# Patient Record
Sex: FEMALE | Employment: FULL TIME | URBAN - METROPOLITAN AREA
[De-identification: names, ages, dates, MRNs, and addresses within clinical notes are randomized per-mention and may not be internally consistent; named-entity substitution may affect disease eponyms.]

---

## 2018-06-15 ENCOUNTER — APPOINTMENT (OUTPATIENT)
Dept: GENERAL RADIOLOGY | Age: 73
End: 2018-06-15
Attending: EMERGENCY MEDICINE
Payer: MEDICARE

## 2018-06-15 ENCOUNTER — HOSPITAL ENCOUNTER (EMERGENCY)
Age: 73
Discharge: HOME | End: 2018-06-15
Attending: EMERGENCY MEDICINE
Payer: MEDICARE

## 2018-06-15 VITALS
TEMPERATURE: 98.8 F | WEIGHT: 140 LBS | HEART RATE: 95 BPM | HEIGHT: 61 IN | DIASTOLIC BLOOD PRESSURE: 91 MMHG | SYSTOLIC BLOOD PRESSURE: 175 MMHG | BODY MASS INDEX: 26.43 KG/M2 | RESPIRATION RATE: 16 BRPM | OXYGEN SATURATION: 96 %

## 2018-06-15 DIAGNOSIS — M79.604 RIGHT LEG PAIN: Primary | ICD-10-CM

## 2018-06-15 PROCEDURE — 99283 EMERGENCY DEPT VISIT LOW MDM: CPT

## 2018-06-15 PROCEDURE — 72100 X-RAY EXAM L-S SPINE 2/3 VWS: CPT

## 2018-06-15 PROCEDURE — 73502 X-RAY EXAM HIP UNI 2-3 VIEWS: CPT

## 2018-06-15 PROCEDURE — 74011250637 HC RX REV CODE- 250/637: Performed by: EMERGENCY MEDICINE

## 2018-06-15 RX ORDER — TRAMADOL HYDROCHLORIDE 50 MG/1
50 TABLET ORAL
Qty: 15 TAB | Refills: 0 | Status: ON HOLD | OUTPATIENT
Start: 2018-06-15 | End: 2020-11-17

## 2018-06-15 RX ORDER — ACETAMINOPHEN 325 MG/1
975 TABLET ORAL
Status: COMPLETED | OUTPATIENT
Start: 2018-06-15 | End: 2018-06-15

## 2018-06-15 RX ORDER — LIDOCAINE 50 MG/G
PATCH TOPICAL
Qty: 1 EACH | Refills: 0 | Status: SHIPPED | OUTPATIENT
Start: 2018-06-15 | End: 2018-08-08 | Stop reason: ALTCHOICE

## 2018-06-15 RX ADMIN — ACETAMINOPHEN 975 MG: 325 TABLET ORAL at 17:08

## 2018-06-15 NOTE — ED TRIAGE NOTES
Pt reports having right hip and right leg pain that began approx 4 weeks ago. Pt states the pain radiates into right thigh. Pt denies fall or injury.

## 2018-06-15 NOTE — ED PROVIDER NOTES
HPI Comments: 68 y.o. female with no significant past medical history who presents from home with chief complaint of back pain. Pt reports \"sharp\" R lower back pain for 4 weeks which radiates through her R leg to her R knee and occasionally into her R calf for several months. She states she had difficulty standing and walking d/t pain this morning. Pt also c/o increased fatigue for 2-3 weeks. Per Pt's Jew sister, Pt spoke w/ cardiologist Dr. Erika Lino last week who noted her sx \"may be arterial\". She is scheduled for cardiology evaluation. Pt has been taking Tylenol w/ mild relief. When asked about a PCP Pt notes her PCP is Dr. Lam Sorenson, but she \"has not seen her in 10 years\". Pt denies similar sx in the past. Pt denies prior hx of HTN, high cholesterol, and back injury. Pt denies fever, numbness, abdominal pain, cough, vomiting, and diarrhea. There are no other acute medical concerns at this time. Old Chart Review: Pt has no prior ED visits in the  system. PCP: Damian Koehler MD    Note written by Kathy Mosqueda, as dictated by Juan Pascal, DO 4:33 PM    The history is provided by the patient and a friend. No past medical history on file. Past Surgical History:   Procedure Laterality Date    ABDOMEN SURGERY PROC UNLISTED      HX APPENDECTOMY  1975    HX HEENT  2006    OS Cataract         No family history on file. Social History     Social History    Marital status: SINGLE     Spouse name: N/A    Number of children: N/A    Years of education: N/A     Occupational History    Not on file. Social History Main Topics    Smoking status: Never Smoker    Smokeless tobacco: Never Used    Alcohol use No    Drug use: Not on file    Sexual activity: Not on file     Other Topics Concern    Not on file     Social History Narrative    No narrative on file         ALLERGIES: Aleve [naproxen sodium]    Review of Systems   Constitutional: Positive for fatigue.  Negative for chills and fever. Respiratory: Negative for cough and shortness of breath. Cardiovascular: Negative for chest pain. Gastrointestinal: Negative for abdominal pain, diarrhea, nausea and vomiting. Genitourinary: Negative for dysuria and hematuria. Musculoskeletal: Positive for back pain, gait problem and myalgias. Neurological: Negative for numbness. All other systems reviewed and are negative. Vitals:    06/15/18 1432   BP: (!) 175/91   Pulse: 95   Resp: 16   Temp: 98.8 °F (37.1 °C)   SpO2: 96%   Weight: 63.5 kg (140 lb)   Height: 5' 1\" (1.549 m)            Physical Exam      Constitutional: Pt is awake and alert. Pt appears well-developed and well-nourished. NAD. HENT:   Head: Normocephalic and atraumatic. Nose: Nose normal.   Mouth/Throat: Oropharynx is clear and moist. No oropharyngeal exudate. Eyes: Conjunctivae and extraocular motions are normal. Pupils are equal, round, and reactive to light. Right eye exhibits no discharge. Left eye exhibits no discharge. No scleral icterus. Neck: No tracheal deviation present. Supple neck. Cardiovascular: Normal rate, regular rhythm, normal heart sounds and intact distal pulses. Exam reveals no gallop and no friction rub. No murmur heard. Pulmonary/Chest: Effort normal and breath sounds normal.  Pt  has no wheezes. Pt  has no rales. Abdominal: Soft. Pt  exhibits no distension and no mass. No tenderness. Pt  has no rebound and no guarding. Musculoskeletal:  Pt  exhibits no edema and no tenderness. Non-tender back. R knee atraumatic. Ext: Normal ROM in all four extremities; not tender to palpation; distal pulses are normal, no edema. Neurological:  Pt is alert. nonfocal neuro exam. Normal motor and sensation. Skin: Skin is warm and dry. Pt  is not diaphoretic. No skin changes. No rash on back. Psychiatric:  Pt  has a normal mood and affect.  Behavior is normal.   Note written by Kathy Sheriff, as dictated by Anne Crooks DO 4:33 PM    [facility]      ED Course       Procedures         reviewed xrays  Not sciatica  Likely msk back pain  Doubt AAA or renal colic. Very msk sounding  otc meds plus flexeril and lidoderm prn. F/u with Dr Sherril Spatz who she has seen for PCP in past, just not in 6 yrs, she says. Sent chart to PCP.

## 2018-06-15 NOTE — ED NOTES
Discharge instructions and prescriptions x2 reviewed with patient. Patient verbalized understanding. Patient via wheelchair to waiting room. Accompanied by sister.

## 2018-06-15 NOTE — DISCHARGE INSTRUCTIONS
Low Back Pain: Exercises  Your Care Instructions  Here are some examples of typical rehabilitation exercises for your condition. Start each exercise slowly. Ease off the exercise if you start to have pain. Your doctor or physical therapist will tell you when you can start these exercises and which ones will work best for you. How to do the exercises  Press-up    1. Lie on your stomach, supporting your body with your forearms. 2. Press your elbows down into the floor to raise your upper back. As you do this, relax your stomach muscles and allow your back to arch without using your back muscles. As your press up, do not let your hips or pelvis come off the floor. 3. Hold for 15 to 30 seconds, then relax. 4. Repeat 2 to 4 times. Alternate arm and leg (bird dog) exercise    Do this exercise slowly. Try to keep your body straight at all times, and do not let one hip drop lower than the other. 1. Start on the floor, on your hands and knees. 2. Tighten your belly muscles. 3. Raise one leg off the floor, and hold it straight out behind you. Be careful not to let your hip drop down, because that will twist your trunk. 4. Hold for about 6 seconds, then lower your leg and switch to the other leg. 5. Repeat 8 to 12 times on each leg. 6. Over time, work up to holding for 10 to 30 seconds each time. 7. If you feel stable and secure with your leg raised, try raising the opposite arm straight out in front of you at the same time. Knee-to-chest exercise    1. Lie on your back with your knees bent and your feet flat on the floor. 2. Bring one knee to your chest, keeping the other foot flat on the floor (or keeping the other leg straight, whichever feels better on your lower back). 3. Keep your lower back pressed to the floor. Hold for at least 15 to 30 seconds. 4. Relax, and lower the knee to the starting position. 5. Repeat with the other leg. Repeat 2 to 4 times with each leg.   6. To get more stretch, put your other leg flat on the floor while pulling your knee to your chest.  Curl-ups    1. Lie on the floor on your back with your knees bent at a 90-degree angle. Your feet should be flat on the floor, about 12 inches from your buttocks. 2. Cross your arms over your chest. If this bothers your neck, try putting your hands behind your neck (not your head), with your elbows spread apart. 3. Slowly tighten your belly muscles and raise your shoulder blades off the floor. 4. Keep your head in line with your body, and do not press your chin to your chest.  5. Hold this position for 1 or 2 seconds, then slowly lower yourself back down to the floor. 6. Repeat 8 to 12 times. Pelvic tilt exercise    1. Lie on your back with your knees bent. 2. \"Brace\" your stomach. This means to tighten your muscles by pulling in and imagining your belly button moving toward your spine. You should feel like your back is pressing to the floor and your hips and pelvis are rocking back. 3. Hold for about 6 seconds while you breathe smoothly. 4. Repeat 8 to 12 times. Heel dig bridging    1. Lie on your back with both knees bent and your ankles bent so that only your heels are digging into the floor. Your knees should be bent about 90 degrees. 2. Then push your heels into the floor, squeeze your buttocks, and lift your hips off the floor until your shoulders, hips, and knees are all in a straight line. 3. Hold for about 6 seconds as you continue to breathe normally, and then slowly lower your hips back down to the floor and rest for up to 10 seconds. 4. Do 8 to 12 repetitions. Hamstring stretch in doorway    1. Lie on your back in a doorway, with one leg through the open door. 2. Slide your leg up the wall to straighten your knee. You should feel a gentle stretch down the back of your leg. 3. Hold the stretch for at least 15 to 30 seconds. Do not arch your back, point your toes, or bend either knee.  Keep one heel touching the floor and the other heel touching the wall. 4. Repeat with your other leg. 5. Do 2 to 4 times for each leg. Hip flexor stretch    1. Kneel on the floor with one knee bent and one leg behind you. Place your forward knee over your foot. Keep your other knee touching the floor. 2. Slowly push your hips forward until you feel a stretch in the upper thigh of your rear leg. 3. Hold the stretch for at least 15 to 30 seconds. Repeat with your other leg. 4. Do 2 to 4 times on each side. Wall sit    1. Stand with your back 10 to 12 inches away from a wall. 2. Lean into the wall until your back is flat against it. 3. Slowly slide down until your knees are slightly bent, pressing your lower back into the wall. 4. Hold for about 6 seconds, then slide back up the wall. 5. Repeat 8 to 12 times. Follow-up care is a key part of your treatment and safety. Be sure to make and go to all appointments, and call your doctor if you are having problems. It's also a good idea to know your test results and keep a list of the medicines you take. Where can you learn more? Go to http://regineMightyTexttommy.info/. Enter T510 in the search box to learn more about \"Low Back Pain: Exercises. \"  Current as of: March 21, 2017  Content Version: 11.4  © 9459-0418 Healthwise, Incorporated. Care instructions adapted under license by [employer] (which disclaims liability or warranty for this information). If you have questions about a medical condition or this instruction, always ask your healthcare professional. [place] [address] any warranty or liability for your use of this information. Back Pain: Care Instructions  Your Care Instructions    Back pain has many possible causes. It is often related to problems with muscles and ligaments of the back. It may also be related to problems with the nerves, discs, or bones of the back.  Moving, lifting, standing, sitting, or sleeping in an awkward way can strain the back. Sometimes you don't notice the injury until later. Arthritis is another common cause of back pain. Although it may hurt a lot, back pain usually improves on its own within several weeks. Most people recover in 12 weeks or less. Using good home treatment and being careful not to stress your back can help you feel better sooner. Follow-up care is a key part of your treatment and safety. Be sure to make and go to all appointments, and call your doctor if you are having problems. It's also a good idea to know your test results and keep a list of the medicines you take. How can you care for yourself at home? · Sit or lie in positions that are most comfortable and reduce your pain. Try one of these positions when you lie down:  ¨ Lie on your back with your knees bent and supported by large pillows. ¨ Lie on the floor with your legs on the seat of a sofa or chair. Maria Teresa Mitchell on your side with your knees and hips bent and a pillow between your legs. ¨ Lie on your stomach if it does not make pain worse. · Do not sit up in bed, and avoid soft couches and twisted positions. Bed rest can help relieve pain at first, but it delays healing. Avoid bed rest after the first day of back pain. · Change positions every 30 minutes. If you must sit for long periods of time, take breaks from sitting. Get up and walk around, or lie in a comfortable position. · Try using a heating pad on a low or medium setting for 15 to 20 minutes every 2 or 3 hours. Try a warm shower in place of one session with the heating pad. · You can also try an ice pack for 10 to 15 minutes every 2 to 3 hours. Put a thin cloth between the ice pack and your skin. · Take pain medicines exactly as directed. ¨ If the doctor gave you a prescription medicine for pain, take it as prescribed. ¨ If you are not taking a prescription pain medicine, ask your doctor if you can take an over-the-counter medicine. · Take short walks several times a day. You can start with 5 to 10 minutes, 3 or 4 times a day, and work up to longer walks. Walk on level surfaces and avoid hills and stairs until your back is better. · Return to work and other activities as soon as you can. Continued rest without activity is usually not good for your back. · To prevent future back pain, do exercises to stretch and strengthen your back and stomach. Learn how to use good posture, safe lifting techniques, and proper body mechanics. When should you call for help? Call your doctor now or seek immediate medical care if:  ? · You have new or worsening numbness in your legs. ? · You have new or worsening weakness in your legs. (This could make it hard to stand up.)   ? · You lose control of your bladder or bowels. ? Watch closely for changes in your health, and be sure to contact your doctor if:  ? · Your pain gets worse. ? · You are not getting better after 2 weeks. Where can you learn more? Go to http://regine-tommy.info/. Enter I274 in the search box to learn more about \"Back Pain: Care Instructions. \"  Current as of: March 21, 2017  Content Version: 11.4  © 2035-5754 Healthwise, Incorporated. Care instructions adapted under license by [employer] (which disclaims liability or warranty for this information). If you have questions about a medical condition or this instruction, always ask your healthcare professional. [address] any warranty or liability for your use of this information.

## 2018-07-12 ENCOUNTER — APPOINTMENT (OUTPATIENT)
Dept: CT IMAGING | Age: 73
DRG: 544 | End: 2018-07-12
Attending: EMERGENCY MEDICINE
Payer: MEDICARE

## 2018-07-12 ENCOUNTER — HOSPITAL ENCOUNTER (INPATIENT)
Age: 73
LOS: 1 days | Discharge: ANOTHER HOSPITAL | DRG: 544 | End: 2018-07-13
Attending: EMERGENCY MEDICINE | Admitting: INTERNAL MEDICINE
Payer: MEDICARE

## 2018-07-12 DIAGNOSIS — D72.829 LEUKOCYTOSIS, UNSPECIFIED TYPE: ICD-10-CM

## 2018-07-12 DIAGNOSIS — M84.451A: Primary | ICD-10-CM

## 2018-07-12 LAB
ALBUMIN SERPL-MCNC: 3.9 G/DL (ref 3.5–5)
ALBUMIN/GLOB SERPL: 1 {RATIO} (ref 1.1–2.2)
ALP SERPL-CCNC: 60 U/L (ref 45–117)
ALT SERPL-CCNC: 14 U/L (ref 12–78)
ANION GAP SERPL CALC-SCNC: 12 MMOL/L (ref 5–15)
AST SERPL-CCNC: 18 U/L (ref 15–37)
BILIRUB SERPL-MCNC: 0.8 MG/DL (ref 0.2–1)
BUN SERPL-MCNC: 22 MG/DL (ref 6–20)
BUN/CREAT SERPL: 34 (ref 12–20)
CALCIUM SERPL-MCNC: 8.8 MG/DL (ref 8.5–10.1)
CHLORIDE SERPL-SCNC: 100 MMOL/L (ref 97–108)
CO2 SERPL-SCNC: 23 MMOL/L (ref 21–32)
CREAT SERPL-MCNC: 0.65 MG/DL (ref 0.55–1.02)
CRP SERPL-MCNC: 2.64 MG/DL (ref 0–0.6)
GLOBULIN SER CALC-MCNC: 4 G/DL (ref 2–4)
GLUCOSE SERPL-MCNC: 136 MG/DL (ref 65–100)
POTASSIUM SERPL-SCNC: 3.3 MMOL/L (ref 3.5–5.1)
PROT SERPL-MCNC: 7.9 G/DL (ref 6.4–8.2)
SODIUM SERPL-SCNC: 135 MMOL/L (ref 136–145)

## 2018-07-12 PROCEDURE — 73700 CT LOWER EXTREMITY W/O DYE: CPT

## 2018-07-12 PROCEDURE — 80053 COMPREHEN METABOLIC PANEL: CPT | Performed by: EMERGENCY MEDICINE

## 2018-07-12 PROCEDURE — 96374 THER/PROPH/DIAG INJ IV PUSH: CPT

## 2018-07-12 PROCEDURE — 85652 RBC SED RATE AUTOMATED: CPT | Performed by: EMERGENCY MEDICINE

## 2018-07-12 PROCEDURE — 96375 TX/PRO/DX INJ NEW DRUG ADDON: CPT

## 2018-07-12 PROCEDURE — 99284 EMERGENCY DEPT VISIT MOD MDM: CPT

## 2018-07-12 PROCEDURE — 74011250636 HC RX REV CODE- 250/636: Performed by: EMERGENCY MEDICINE

## 2018-07-12 PROCEDURE — 86140 C-REACTIVE PROTEIN: CPT | Performed by: EMERGENCY MEDICINE

## 2018-07-12 PROCEDURE — 51702 INSERT TEMP BLADDER CATH: CPT

## 2018-07-12 PROCEDURE — 85025 COMPLETE CBC W/AUTO DIFF WBC: CPT | Performed by: EMERGENCY MEDICINE

## 2018-07-12 PROCEDURE — 36415 COLL VENOUS BLD VENIPUNCTURE: CPT | Performed by: EMERGENCY MEDICINE

## 2018-07-12 PROCEDURE — 77030005514 HC CATH URETH FOL14 BARD -A

## 2018-07-12 RX ORDER — HYDROMORPHONE HYDROCHLORIDE 1 MG/ML
0.5 INJECTION, SOLUTION INTRAMUSCULAR; INTRAVENOUS; SUBCUTANEOUS ONCE
Status: COMPLETED | OUTPATIENT
Start: 2018-07-12 | End: 2018-07-12

## 2018-07-12 RX ORDER — ONDANSETRON 2 MG/ML
4 INJECTION INTRAMUSCULAR; INTRAVENOUS
Status: COMPLETED | OUTPATIENT
Start: 2018-07-12 | End: 2018-07-12

## 2018-07-12 RX ADMIN — ONDANSETRON 4 MG: 2 INJECTION INTRAMUSCULAR; INTRAVENOUS at 23:39

## 2018-07-12 RX ADMIN — HYDROMORPHONE HYDROCHLORIDE 0.5 MG: 1 INJECTION, SOLUTION INTRAMUSCULAR; INTRAVENOUS; SUBCUTANEOUS at 23:39

## 2018-07-12 NOTE — IP AVS SNAPSHOT
[address] 
[phone] Patient: Edison Osorio MRN: [id number] MLN:[id number] A check jackie indicates which time of day the medication should be taken. My Medications CONTINUE taking these medications Instructions Each Dose to Equal  
 Morning Noon Evening Bedtime  
 lidocaine 5 % Commonly known as:  Carolyn Valdez Your last dose was: Your next dose is:    
   
   
 Apply patch to the affected area for 12 hours a day and remove for 12 hours a day. traMADol 50 mg tablet Commonly known as:  ULTRAM  
   
Your last dose was: Your next dose is: Take 1 Tab by mouth every six (6) hours as needed for Pain.  Max Daily Amount: 200 mg.  
 50 mg

## 2018-07-13 ENCOUNTER — APPOINTMENT (OUTPATIENT)
Dept: GENERAL RADIOLOGY | Age: 73
DRG: 544 | End: 2018-07-13
Attending: EMERGENCY MEDICINE
Payer: MEDICARE

## 2018-07-13 ENCOUNTER — APPOINTMENT (OUTPATIENT)
Dept: GENERAL RADIOLOGY | Age: 73
DRG: 544 | End: 2018-07-13
Attending: PHYSICIAN ASSISTANT
Payer: MEDICARE

## 2018-07-13 ENCOUNTER — APPOINTMENT (OUTPATIENT)
Dept: MRI IMAGING | Age: 73
DRG: 544 | End: 2018-07-13
Attending: PHYSICIAN ASSISTANT
Payer: MEDICARE

## 2018-07-13 VITALS
HEART RATE: 63 BPM | RESPIRATION RATE: 16 BRPM | OXYGEN SATURATION: 98 % | SYSTOLIC BLOOD PRESSURE: 139 MMHG | DIASTOLIC BLOOD PRESSURE: 76 MMHG | TEMPERATURE: 99 F

## 2018-07-13 PROBLEM — M84.40XA PATHOLOGIC FRACTURE: Status: ACTIVE | Noted: 2018-07-13

## 2018-07-13 PROBLEM — M84.451A PATHOLOGICAL FRACTURE OF RIGHT HIP (HCC): Status: ACTIVE | Noted: 2018-07-13

## 2018-07-13 LAB
ABO + RH BLD: NORMAL
AMORPH CRY URNS QL MICRO: ABNORMAL
APPEARANCE UR: CLEAR
APTT PPP: 24 SEC (ref 22.1–32)
ATRIAL RATE: 61 BPM
BACTERIA URNS QL MICRO: NEGATIVE /HPF
BASOPHILS # BLD: 0.1 K/UL (ref 0–0.1)
BASOPHILS NFR BLD: 0 % (ref 0–1)
BILIRUB UR QL: NEGATIVE
BLOOD GROUP ANTIBODIES SERPL: NORMAL
CALCULATED P AXIS, ECG09: 57 DEGREES
CALCULATED R AXIS, ECG10: 44 DEGREES
CALCULATED T AXIS, ECG11: 29 DEGREES
CHOLEST SERPL-MCNC: 111 MG/DL
COLOR UR: ABNORMAL
DIAGNOSIS, 93000: NORMAL
DIFFERENTIAL METHOD BLD: ABNORMAL
EOSINOPHIL # BLD: 0 K/UL (ref 0–0.4)
EOSINOPHIL NFR BLD: 0 % (ref 0–7)
EPITH CASTS URNS QL MICRO: ABNORMAL /LPF
ERYTHROCYTE [DISTWIDTH] IN BLOOD BY AUTOMATED COUNT: 13.1 % (ref 11.5–14.5)
ERYTHROCYTE [SEDIMENTATION RATE] IN BLOOD: 9 MM/HR (ref 0–30)
EST. AVERAGE GLUCOSE BLD GHB EST-MCNC: 117 MG/DL
GLUCOSE UR STRIP.AUTO-MCNC: NEGATIVE MG/DL
HBA1C MFR BLD: 5.7 % (ref 4.2–6.3)
HCT VFR BLD AUTO: 35.6 % (ref 35–47)
HDLC SERPL-MCNC: 42 MG/DL
HDLC SERPL: 2.6 {RATIO} (ref 0–5)
HGB BLD-MCNC: 11.7 G/DL (ref 11.5–16)
HGB UR QL STRIP: NEGATIVE
IMM GRANULOCYTES # BLD: 0.3 K/UL (ref 0–0.04)
IMM GRANULOCYTES NFR BLD AUTO: 1 % (ref 0–0.5)
INR PPP: 1.1 (ref 0.9–1.1)
KETONES UR QL STRIP.AUTO: 15 MG/DL
LACTATE SERPL-SCNC: 1.3 MMOL/L (ref 0.4–2)
LDLC SERPL CALC-MCNC: 56.2 MG/DL (ref 0–100)
LEUKOCYTE ESTERASE UR QL STRIP.AUTO: NEGATIVE
LIPID PROFILE,FLP: NORMAL
LYMPHOCYTES # BLD: 1.4 K/UL (ref 0.8–3.5)
LYMPHOCYTES NFR BLD: 7 % (ref 12–49)
MAGNESIUM SERPL-MCNC: 2 MG/DL (ref 1.6–2.4)
MCH RBC QN AUTO: 31.7 PG (ref 26–34)
MCHC RBC AUTO-ENTMCNC: 32.9 G/DL (ref 30–36.5)
MCV RBC AUTO: 96.5 FL (ref 80–99)
MONOCYTES # BLD: 1.7 K/UL (ref 0–1)
MONOCYTES NFR BLD: 8 % (ref 5–13)
NEUTS SEG # BLD: 18.1 K/UL (ref 1.8–8)
NEUTS SEG NFR BLD: 84 % (ref 32–75)
NITRITE UR QL STRIP.AUTO: NEGATIVE
NRBC # BLD: 0 K/UL (ref 0–0.01)
NRBC BLD-RTO: 0 PER 100 WBC
P-R INTERVAL, ECG05: 150 MS
PH UR STRIP: 7 [PH] (ref 5–8)
PHOSPHATE SERPL-MCNC: 3.1 MG/DL (ref 2.6–4.7)
PLATELET # BLD AUTO: 183 K/UL (ref 150–400)
PMV BLD AUTO: 11 FL (ref 8.9–12.9)
PROT UR STRIP-MCNC: NEGATIVE MG/DL
PROTHROMBIN TIME: 11.5 SEC (ref 9–11.1)
Q-T INTERVAL, ECG07: 416 MS
QRS DURATION, ECG06: 76 MS
QTC CALCULATION (BEZET), ECG08: 418 MS
RBC # BLD AUTO: 3.69 M/UL (ref 3.8–5.2)
RBC #/AREA URNS HPF: ABNORMAL /HPF (ref 0–5)
SP GR UR REFRACTOMETRY: 1.02 (ref 1–1.03)
SPECIMEN EXP DATE BLD: NORMAL
THERAPEUTIC RANGE,PTTT: NORMAL SECS (ref 58–77)
TRIGL SERPL-MCNC: 64 MG/DL (ref ?–150)
TSH SERPL DL<=0.05 MIU/L-ACNC: 0.69 UIU/ML (ref 0.36–3.74)
UR CULT HOLD, URHOLD: NORMAL
UROBILINOGEN UR QL STRIP.AUTO: 1 EU/DL (ref 0.2–1)
VENTRICULAR RATE, ECG03: 61 BPM
VLDLC SERPL CALC-MCNC: 12.8 MG/DL
WBC # BLD AUTO: 21.6 K/UL (ref 3.6–11)
WBC URNS QL MICRO: ABNORMAL /HPF (ref 0–4)

## 2018-07-13 PROCEDURE — 85730 THROMBOPLASTIN TIME PARTIAL: CPT | Performed by: EMERGENCY MEDICINE

## 2018-07-13 PROCEDURE — 74011250636 HC RX REV CODE- 250/636: Performed by: EMERGENCY MEDICINE

## 2018-07-13 PROCEDURE — 71045 X-RAY EXAM CHEST 1 VIEW: CPT

## 2018-07-13 PROCEDURE — 84443 ASSAY THYROID STIM HORMONE: CPT | Performed by: INTERNAL MEDICINE

## 2018-07-13 PROCEDURE — 93005 ELECTROCARDIOGRAM TRACING: CPT

## 2018-07-13 PROCEDURE — 74011250637 HC RX REV CODE- 250/637: Performed by: PHYSICIAN ASSISTANT

## 2018-07-13 PROCEDURE — 94760 N-INVAS EAR/PLS OXIMETRY 1: CPT

## 2018-07-13 PROCEDURE — 80061 LIPID PANEL: CPT | Performed by: INTERNAL MEDICINE

## 2018-07-13 PROCEDURE — 74011250637 HC RX REV CODE- 250/637: Performed by: INTERNAL MEDICINE

## 2018-07-13 PROCEDURE — 74011250636 HC RX REV CODE- 250/636: Performed by: INTERNAL MEDICINE

## 2018-07-13 PROCEDURE — 73723 MRI JOINT LWR EXTR W/O&W/DYE: CPT

## 2018-07-13 PROCEDURE — 83605 ASSAY OF LACTIC ACID: CPT | Performed by: INTERNAL MEDICINE

## 2018-07-13 PROCEDURE — 81001 URINALYSIS AUTO W/SCOPE: CPT | Performed by: EMERGENCY MEDICINE

## 2018-07-13 PROCEDURE — 86900 BLOOD TYPING SEROLOGIC ABO: CPT | Performed by: EMERGENCY MEDICINE

## 2018-07-13 PROCEDURE — 83036 HEMOGLOBIN GLYCOSYLATED A1C: CPT | Performed by: INTERNAL MEDICINE

## 2018-07-13 PROCEDURE — 65270000029 HC RM PRIVATE

## 2018-07-13 PROCEDURE — A9575 INJ GADOTERATE MEGLUMI 0.1ML: HCPCS | Performed by: INTERNAL MEDICINE

## 2018-07-13 PROCEDURE — 84100 ASSAY OF PHOSPHORUS: CPT | Performed by: INTERNAL MEDICINE

## 2018-07-13 PROCEDURE — 83735 ASSAY OF MAGNESIUM: CPT | Performed by: INTERNAL MEDICINE

## 2018-07-13 PROCEDURE — 85610 PROTHROMBIN TIME: CPT | Performed by: EMERGENCY MEDICINE

## 2018-07-13 PROCEDURE — 73502 X-RAY EXAM HIP UNI 2-3 VIEWS: CPT

## 2018-07-13 PROCEDURE — 36415 COLL VENOUS BLD VENIPUNCTURE: CPT | Performed by: EMERGENCY MEDICINE

## 2018-07-13 PROCEDURE — 77010033678 HC OXYGEN DAILY

## 2018-07-13 RX ORDER — SODIUM CHLORIDE 0.9 % (FLUSH) 0.9 %
5-10 SYRINGE (ML) INJECTION AS NEEDED
Status: DISCONTINUED | OUTPATIENT
Start: 2018-07-13 | End: 2018-07-13 | Stop reason: HOSPADM

## 2018-07-13 RX ORDER — HYDROMORPHONE HYDROCHLORIDE 1 MG/ML
1 INJECTION, SOLUTION INTRAMUSCULAR; INTRAVENOUS; SUBCUTANEOUS ONCE
Status: COMPLETED | OUTPATIENT
Start: 2018-07-13 | End: 2018-07-13

## 2018-07-13 RX ORDER — POTASSIUM CHLORIDE 750 MG/1
40 TABLET, FILM COATED, EXTENDED RELEASE ORAL
Status: COMPLETED | OUTPATIENT
Start: 2018-07-13 | End: 2018-07-13

## 2018-07-13 RX ORDER — ACETAMINOPHEN 325 MG/1
650 TABLET ORAL
Status: DISCONTINUED | OUTPATIENT
Start: 2018-07-13 | End: 2018-07-13 | Stop reason: HOSPADM

## 2018-07-13 RX ORDER — SODIUM CHLORIDE 0.9 % (FLUSH) 0.9 %
5-10 SYRINGE (ML) INJECTION EVERY 8 HOURS
Status: DISCONTINUED | OUTPATIENT
Start: 2018-07-13 | End: 2018-07-13 | Stop reason: HOSPADM

## 2018-07-13 RX ORDER — OXYCODONE HYDROCHLORIDE 5 MG/1
5 TABLET ORAL
Status: DISCONTINUED | OUTPATIENT
Start: 2018-07-13 | End: 2018-07-13 | Stop reason: HOSPADM

## 2018-07-13 RX ORDER — HYDROCODONE BITARTRATE AND ACETAMINOPHEN 5; 325 MG/1; MG/1
1 TABLET ORAL
Status: DISCONTINUED | OUTPATIENT
Start: 2018-07-13 | End: 2018-07-13 | Stop reason: ALTCHOICE

## 2018-07-13 RX ORDER — FENTANYL CITRATE 50 UG/ML
25 INJECTION, SOLUTION INTRAMUSCULAR; INTRAVENOUS
Status: DISCONTINUED | OUTPATIENT
Start: 2018-07-13 | End: 2018-07-13 | Stop reason: HOSPADM

## 2018-07-13 RX ORDER — GADOTERATE MEGLUMINE 376.9 MG/ML
14 INJECTION INTRAVENOUS
Status: COMPLETED | OUTPATIENT
Start: 2018-07-13 | End: 2018-07-13

## 2018-07-13 RX ORDER — SODIUM CHLORIDE 9 MG/ML
100 INJECTION, SOLUTION INTRAVENOUS CONTINUOUS
Status: DISCONTINUED | OUTPATIENT
Start: 2018-07-13 | End: 2018-07-13 | Stop reason: HOSPADM

## 2018-07-13 RX ORDER — OXYCODONE HYDROCHLORIDE 5 MG/1
10 TABLET ORAL
Status: DISCONTINUED | OUTPATIENT
Start: 2018-07-13 | End: 2018-07-13 | Stop reason: HOSPADM

## 2018-07-13 RX ORDER — HEPARIN SODIUM 5000 [USP'U]/ML
5000 INJECTION, SOLUTION INTRAVENOUS; SUBCUTANEOUS EVERY 8 HOURS
Status: DISCONTINUED | OUTPATIENT
Start: 2018-07-13 | End: 2018-07-13 | Stop reason: HOSPADM

## 2018-07-13 RX ORDER — BISACODYL 5 MG
5 TABLET, DELAYED RELEASE (ENTERIC COATED) ORAL DAILY PRN
Status: DISCONTINUED | OUTPATIENT
Start: 2018-07-13 | End: 2018-07-13 | Stop reason: HOSPADM

## 2018-07-13 RX ORDER — ACETAMINOPHEN 325 MG/1
650 TABLET ORAL EVERY 6 HOURS
Status: DISCONTINUED | OUTPATIENT
Start: 2018-07-13 | End: 2018-07-13 | Stop reason: HOSPADM

## 2018-07-13 RX ORDER — LIDOCAINE 50 MG/G
1 PATCH TOPICAL EVERY 24 HOURS
Status: DISCONTINUED | OUTPATIENT
Start: 2018-07-13 | End: 2018-07-13 | Stop reason: HOSPADM

## 2018-07-13 RX ORDER — ONDANSETRON 2 MG/ML
4 INJECTION INTRAMUSCULAR; INTRAVENOUS
Status: DISCONTINUED | OUTPATIENT
Start: 2018-07-13 | End: 2018-07-13 | Stop reason: HOSPADM

## 2018-07-13 RX ADMIN — ONDANSETRON 4 MG: 2 INJECTION, SOLUTION INTRAMUSCULAR; INTRAVENOUS at 11:30

## 2018-07-13 RX ADMIN — HYDROCODONE BITARTRATE AND ACETAMINOPHEN 1 TABLET: 5; 325 TABLET ORAL at 05:29

## 2018-07-13 RX ADMIN — POTASSIUM CHLORIDE 40 MEQ: 750 TABLET, EXTENDED RELEASE ORAL at 05:25

## 2018-07-13 RX ADMIN — HEPARIN SODIUM 5000 UNITS: 5000 INJECTION, SOLUTION INTRAVENOUS; SUBCUTANEOUS at 05:25

## 2018-07-13 RX ADMIN — HEPARIN SODIUM 5000 UNITS: 5000 INJECTION, SOLUTION INTRAVENOUS; SUBCUTANEOUS at 12:45

## 2018-07-13 RX ADMIN — SODIUM CHLORIDE 100 ML/HR: 900 INJECTION, SOLUTION INTRAVENOUS at 03:12

## 2018-07-13 RX ADMIN — GADOTERATE MEGLUMINE 14 ML: 376.9 INJECTION INTRAVENOUS at 10:00

## 2018-07-13 RX ADMIN — OXYCODONE HYDROCHLORIDE 10 MG: 5 TABLET ORAL at 11:27

## 2018-07-13 RX ADMIN — HYDROMORPHONE HYDROCHLORIDE 1 MG: 1 INJECTION, SOLUTION INTRAMUSCULAR; INTRAVENOUS; SUBCUTANEOUS at 02:36

## 2018-07-13 RX ADMIN — Medication 5 ML: at 06:00

## 2018-07-13 RX ADMIN — FENTANYL CITRATE 12.5 MCG: 50 INJECTION, SOLUTION INTRAMUSCULAR; INTRAVENOUS at 08:42

## 2018-07-13 RX ADMIN — ACETAMINOPHEN 650 MG: 325 TABLET ORAL at 11:26

## 2018-07-13 NOTE — PROGRESS NOTES
ORTH FRACTURE PROGRESS NOTE    2018  Admit Date:   2018    Post Op day: * No surgery found *    Subjective:    Charlotte Romero still in considerable pain. 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 Patient with no previous cancer history but also no true mechanism for current fracture. Pain continues to be an issue but is only getting Norco.  NPO at this time.     PT/OT:   Gait:                    Vital Signs:    Patient Vitals for the past 8 hrs:   BP Temp Pulse Resp SpO2   18 0828 121/66 98.4 °F (36.9 °C) (!) 59 16 99 %   18 0523 159/75 97.2 °F (36.2 °C) 63 16 97 %   18 0330 125/67 - - - 96 %     Temp (24hrs), Av.4 °F (36.9 °C), Min:97.2 °F (36.2 °C), Max:99.2 °F (37.3 °C)      Pain Control:   Pain Assessment  Pain Scale 1: Numeric (0 - 10)  Pain Intensity 1: 10  Pain Onset 1: Today  Pain Location 1: Hip  Pain Orientation 1: Right  Pain Description 1: Aching  Pain Intervention(s) 1: Medication (see MAR)    Meds:    Current Facility-Administered Medications   Medication Dose Route Frequency    lidocaine (LIDODERM) 5 % patch 1 Patch  1 Patch TransDERmal Q24H    sodium chloride (NS) flush 5-10 mL  5-10 mL IntraVENous Q8H    sodium chloride (NS) flush 5-10 mL  5-10 mL IntraVENous PRN    0.9% sodium chloride infusion  100 mL/hr IntraVENous CONTINUOUS    acetaminophen (TYLENOL) tablet 650 mg  650 mg Oral Q4H PRN    fentaNYL citrate (PF) injection 25 mcg  25 mcg IntraVENous Q4H PRN    ondansetron (ZOFRAN) injection 4 mg  4 mg IntraVENous Q4H PRN    bisacodyl (DULCOLAX) tablet 5 mg  5 mg Oral DAILY PRN    heparin (porcine) injection 5,000 Units  5,000 Units SubCUTAneous Q8H    oxyCODONE IR (ROXICODONE) tablet 5 mg  5 mg Oral Q4H PRN    oxyCODONE IR (ROXICODONE) tablet 10 mg  10 mg Oral Q4H PRN    acetaminophen (TYLENOL) tablet 650 mg  650 mg Oral Q6H       LAB:    Recent Labs      18   0111  18   2319   HCT --   35.6   HGB   --   11.7   INR  1.1   --        Transfuse PRBC's:      Assessment & Physician's Comment:  Neurovascular checks within normal limits  Orientation:  Oriented    Principal Problem:    Pathological fracture of right hip ([address] (7/13/2018)    Active Problems:    Pathologic fracture (7/13/2018)        Plan:    Patient condition discussed with Dr Laila Liang who is of the opinion that she would be best served by Orthopedic Oncology services at [facility]. He has spoken with Dr Diedre Ganser there who has agreed to accept the patient. Patient informed of imaging results and concerns for neoplastic disease that is necessitating the transfer. She and her  agree to transfer.   Case Management for transfer transportation needs  Pain medications ordered  Remain NPO for now  Medical management per primary team    Dr Laila Liang aware of patient and in agreement with plan of care    David Waller PA-C   Orthopedic Trauma Service   [facility]

## 2018-07-13 NOTE — DISCHARGE SUMMARY
Discharge Summary       PATIENT ID: Edison Osorio  MRN: [id number]   YOB: 1945    DATE OF ADMISSION: 7/12/2018  9:38 PM    DATE OF DISCHARGE: 7/13/18   PRIMARY CARE PROVIDER: Jasmine Arriaga MD     ATTENDING PHYSICIAN: Cristofer Awad  DISCHARGING PROVIDER: Giacomo Veloz MD    To contact this individual call [phone] and ask the  to page. If unavailable ask to be transferred the Adult Hospitalist Department. CONSULTATIONS: IP CONSULT TO ORTHOPEDIC SURGERY    PROCEDURES/SURGERIES: * No surgery found *    ADMITTING DIAGNOSES & HOSPITAL COURSE:   HISTORY OF PRESENT ILLNESS:  This is a 80-year-old woman without any significant past medical history, who was in her usual state of health until a couple of weeks ago when the patient started experiencing right hip pain. The pain is a constant, sharp pain with radiation to the right foot, worse with ambulation. No known relieving factors. The pain is progressive to the extent that the patient is not able to ambulate and has to be using a walker for ambulation. She was seen by primary care physician. Patient was placed on pain medication and short course of prednisone without any significant improvement. Patient was brought to the emergency room today, because of worsening symptoms, for evaluation. When the patient arrived at the emergency room, CT scan of the right lower extremity was obtained. The CT scan shows a pathological fracture through the right intertrochanteric region with a soft tissue extension anteriorly. The emergency room physician consulted orthopedic service on call. Admission to the hospitalist service was advised. MRI of the lower extremity was also advised. Patient was subsequently referred to the hospitalist service for evaluation for admission. There was no history of trauma to the right hip. Patient did not fall. DISCHARGE DIAGNOSES / PLAN:      1.  Transferred to [facility] for further MGMT      Hien Osorio discussed with Dr Yajaira Mcdowell who is of the opinion that she would be best served by Orthopedic Oncology services at [facility]. He has spoken with Dr Edwin Hernandez there who has agreed to accept the patient. Patient informed of imaging results and concerns for neoplastic disease that is necessitating the transfer. She and her  agree to transfer.     Dr Yajaira Mcdowell aware of patient and in agreement with plan of care       PENDING TEST RESULTS:   At the time of discharge the following test results are still pending:     FOLLOW UP APPOINTMENTS:    Follow-up Information     Follow up With Details Comments Contact Info    Gera Zamudio MD In 4 weeks  [address]  [phone]             ADDITIONAL CARE RECOMMENDATIONS:     DIET: Cardiac Diet    ACTIVITY: Activity as tolerated    WOUND CARE:     EQUIPMENT needed:       DISCHARGE MEDICATIONS:  Current Discharge Medication List      CONTINUE these medications which have NOT CHANGED    Details   traMADol (ULTRAM) 50 mg tablet Take 1 Tab by mouth every six (6) hours as needed for Pain. Max Daily Amount: 200 mg. Qty: 15 Tab, Refills: 0    Associated Diagnoses: Right leg pain      lidocaine (LIDODERM) 5 % Apply patch to the affected area for 12 hours a day and remove for 12 hours a day. Qty: 1 Each, Refills: 0               NOTIFY YOUR PHYSICIAN FOR ANY OF THE FOLLOWING:   Fever over 101 degrees for 24 hours. Chest pain, shortness of breath, fever, chills, nausea, vomiting, diarrhea, change in mentation, falling, weakness, bleeding. Severe pain or pain not relieved by medications. Or, any other signs or symptoms that you may have questions about.     DISPOSITION:    Home With:   OT  PT  HH  RN       Long term SNF/Inpatient Rehab    Independent/assisted living    Hospice   x Other: [facility]       PATIENT CONDITION AT DISCHARGE:     Functional status   x Poor     Deconditioned     Independent      Cognition   x  Lucid     Forgetful Dementia      Catheters/lines (plus indication)    Viveros     PICC     PEG    x None      Code status   x  Full code     DNR      PHYSICAL EXAMINATION AT DISCHARGE:   Refer to Progress Note      CHRONIC MEDICAL DIAGNOSES:  Problem List as of 7/13/2018  Date Reviewed: 7/13/2018          Codes Class Noted - Resolved    * (Principal)Pathological fracture of right hip ([facility] [address] ICD-10-CM: U12.078G  ICD-9-CM: 733.14  7/13/2018 - Present        Pathologic fracture ICD-10-CM: M84.40XA  ICD-9-CM: 733.10  7/13/2018 - Present        Cataract of right eye ICD-10-CM: H26.9  ICD-9-CM: 366.9  2/8/2013 - Present              Greater than 20  minutes were spent with the patient on counseling and coordination of care    Signed:   Leeroy Rosas MD  7/13/2018  2:15 PM

## 2018-07-13 NOTE — PROGRESS NOTES
CRM was notified that this patient will be transferred to [address] under Dr. Chio Hogan service. BURAK called the transfer center at [phone] and faxed the face sheet to [phone]. They will reach out to Dr. Geena Santos. Dr. Farrah Stiles has spoken to him regarding the transfer and he has accepted. CRM will follow. KJT    The patient will be transferred to [address] [place] [address] via [facility] to [address] [place] [address] wing room 340 unit # [phone]. Discharge folder on the hard chart. [employer]AL[employer] to follow.  SHANNANT

## 2018-07-13 NOTE — ED PROVIDER NOTES
HPI Comments: 68 y.o. female with no significant past medical history who presents via EMS from [facility] with chief complaint of hip pain. Patient arrives c/o several day Hx of right hip pain radiating down her right leg. Patient ambulates with a cane at baseline, but has been using a walker secondary to pain. Patient reports being unable to ambulate today at all. Patient states she was evaluated by a PA for Dr. Therese Farnsworth 3 days ago (7/9/18) and prescribed prednisone and tramadol for pain, with which she has been compliant. Last dose this morning. Patient denies recent precipitating falls or trauma. Pain is 10/10 and increased with movement. Patient denies back pain, fever, dysuria, and bowel changes. There are no other acute medical concerns at this time. Social hx: Never tobacco smoker; Denies EtOH use; Denies illicit drug use  PCP: Adri Calvert MD    Note written by Kathy Rivera, as dictated by Delgado Osborne MD 11:00 PM    The history is provided by the patient and a friend. No  was used. History reviewed. No pertinent past medical history. Past Surgical History:   Procedure Laterality Date    ABDOMEN SURGERY PROC UNLISTED      HX APPENDECTOMY  1975    HX HEENT  2006    OS Cataract         History reviewed. No pertinent family history. Social History     Social History    Marital status: SINGLE     Spouse name: N/A    Number of children: N/A    Years of education: N/A     Occupational History    Not on file. Social History Main Topics    Smoking status: Never Smoker    Smokeless tobacco: Never Used    Alcohol use No    Drug use: Not on file    Sexual activity: Not on file     Other Topics Concern    Not on file     Social History Narrative         ALLERGIES: Aleve [naproxen sodium]    Review of Systems   Constitutional: Negative for appetite change, chills and fever.    HENT: Negative for congestion, nosebleeds and sore throat. Eyes: Negative for pain and discharge. Respiratory: Negative for cough and shortness of breath. Cardiovascular: Negative for chest pain. Gastrointestinal: Negative for abdominal pain, blood in stool, constipation, diarrhea, nausea and vomiting. Genitourinary: Negative for dysuria. Musculoskeletal: Positive for gait problem. Negative for back pain. +right hip pain radiating down right leg   Skin: Negative for rash. Neurological: Negative for weakness and headaches. Hematological: Negative for adenopathy. Psychiatric/Behavioral: Negative. All other systems reviewed and are negative. Vitals:    07/12/18 2151   BP: 157/77   Pulse: 65   Resp: 18   Temp: 99.2 °F (37.3 °C)   SpO2: 98%            Physical Exam   Constitutional: She is oriented to person, place, and time. She appears well-developed and well-nourished. HENT:   Head: Normocephalic and atraumatic. Mouth/Throat: Oropharynx is clear and moist.   Eyes: Conjunctivae are normal.   Neck: Normal range of motion. Neck supple. Cardiovascular: Normal rate, regular rhythm and normal heart sounds. Pulmonary/Chest: Effort normal and breath sounds normal.   Abdominal: Soft. Bowel sounds are normal. There is no tenderness. Musculoskeletal: She exhibits tenderness. She exhibits no edema or deformity. Severe pain with any movement of R hip. Neurological: She is alert and oriented to person, place, and time. Skin: Skin is warm and dry. Psychiatric: She has a normal mood and affect. Her behavior is normal.   Nursing note and vitals reviewed. Note written by Kathy Soto, as dictated by Tyshawn Bueno MD 11:00 PM    [facility]      ED Course       Procedures     12:52 AM  Dr. Sheri Fraire spoke with SHERRY Rodríguez - ortho - will evaluate patient. Would like MRI with contrast ordered. CONSULT:  Dr. Elisabeth Gonzales - will admit    ED EKG interpretation:  Rhythm: normal sinus rhythm; and regular .  Rate (approx.): 60; Axis: normal; P wave: normal; QRS interval: normal ; ST/T wave: non-specific changes; This EKG was interpreted by Laura Ramos MD,ED Provider. A/P:  1. Pathologic femur fracture - admit. Preop preformed.   2. Leukocytosis

## 2018-07-13 NOTE — CONSULTS
Geriatric Fracture Consult  Patient: Sammy Roland  YOB: 1945   MRN: [id number]      Consult Date: 7/13/2018     Chief Complaint: Right hip pain  ED Presentation Time: < 8 hours  Mechanism of Injury: No trauma, pain for 2-3 months, worse in last few days  Ambulatory Status: Started using cane several months ago for right hip pain  Past Medical History: History reviewed. No pertinent past medical history. Allergies: Allergies   Allergen Reactions    Aleve [Naproxen Sodium] Nausea Only      Past Surgical History:   Past Surgical History:   Procedure Laterality Date    ABDOMEN SURGERY PROC UNLISTED      HX APPENDECTOMY  1975    HX HEENT  2006    OS Cataract      Social History:   Social History     Social History    Marital status: SINGLE     Spouse name: N/A    Number of children: N/A    Years of education: N/A     Occupational History    Not on file. Social History Main Topics    Smoking status: Never Smoker    Smokeless tobacco: Never Used    Alcohol use No    Drug use: Not on file    Sexual activity: Not on file     Other Topics Concern    Not on file     Social History Narrative      Dwelling Status: Alone with Spouse/Significant Other  Current Anticoagulant Medications: none  History of falls: NO  Prior Fractures: none  Osteoporosis Medications: none    Labs:    Lab Results   Component Value Date/Time    HGB 11.7 07/12/2018 11:19 PM    WBC 21.6 (H) 07/12/2018 11:19 PM    INR 1.1 07/13/2018 01:11 AM    Albumin 3.9 07/12/2018 11:19 PM      IMAGING STUDIES:   CT LOW EXT RT WO CONT 7/12/2018 11:34 PM  INDICATION:  unable to walk. Severe R hip/femur pain. EXAM: CT right femur. Comparison Lottie 15, 2018. Thin section axial images were obtained. From these sagittal and coronal  reformats were performed. CT dose reduction was achieved through use of a  standardized protocol tailored for this examination and automatic exposure  control for dose modulation.    FINDINGS: There is an acute pathologic fracture of the right intertrochanteric  region. Fracture is mildly impacted Lytic lesion measures approximately 7.7 x  4.5 x 5.3 cm. There is soft tissue extension anteriorly. Small right hip  effusion. No other lytic lesions are demonstrated. IMPRESSION:  1. Pathologic fracture through the right intertrochanteric region with soft  tissue extension anteriorly. Physical Exam:   General: 77yo female, cooperative, no distress, appears stated age  CNS: alert/oriented, normal mood  Vascular: pedal pulses normal and no edema   Skin: no rash or abnormalities  Extremities: right leg shortened, pain with hip ROM  Neurologic: motor/sensation normal in right LE    Assessment: Pathologic intertrochanteric fracture right hip     Plan: Concerning lytic lesion in right proximal femur with pathologic intertrochanteric hip fracture. Will need surgical repair as well as investigation of lesion. Will get plain xrays and MRI of right hip. Plan for IM nailing of right hip and biopsy of bone in OR. Discussed with patient. Keep NPO.      Signed by: SHERRY Saba   Today's Date: July 13, 2018

## 2018-07-13 NOTE — ED NOTES
PLEASE CALL Sister Teetee Recinos prior to surgery! They reside in [facility] and would need 45 minutes in order to get here.    [phone]

## 2018-07-13 NOTE — H&P
[address]  HISTORY AND PHYSICAL      Francisco J Naqvi  MR#: [id number]  : 1945  ACCOUNT #: [de-identified]   ADMIT DATE: 2018    PRIMARY CARE PHYSICIAN:  Yolanda Barroso MD      SOURCE OF INFORMATION:  Patient and patient's friends who were present at the bedside. CHIEF COMPLAINT:  Right hip pain. HISTORY OF PRESENT ILLNESS:  This is a 77-year-old woman without any significant past medical history, who was in her usual state of health until a couple of weeks ago when the patient started experiencing right hip pain. The pain is a constant, sharp pain with radiation to the right foot, worse with ambulation. No known relieving factors. The pain is progressive to the extent that the patient is not able to ambulate and has to be using a walker for ambulation. She was seen by primary care physician. Patient was placed on pain medication and short course of prednisone without any significant improvement. Patient was brought to the emergency room today, because of worsening symptoms, for evaluation. When the patient arrived at the emergency room, CT scan of the right lower extremity was obtained. The CT scan shows a pathological fracture through the right intertrochanteric region with a soft tissue extension anteriorly. The emergency room physician consulted orthopedic service on call. Admission to the hospitalist service was advised. MRI of the lower extremity was also advised. Patient was subsequently referred to the hospitalist service for evaluation for admission. There was no history of trauma to the right hip. Patient did not fall. PAST MEDICAL HISTORY:  Not significant. ALLERGIES:  PATIENT IS ALLERGIC TO NAPROSYN. MEDICATIONS:  Lidoderm patch 5% applied to skin every 12 hours, tramadol 50 mg every 6 hours as needed for pain. FAMILY HISTORY:  This was reviewed. It is not pertinent to present illness.       PAST SURGICAL HISTORY:  This is significant for cataract extraction, appendectomy, abdominal surgery. SOCIAL HISTORY:  No history of alcohol or tobacco abuse. REVIEW OF SYSTEMS:    HEAD, EYES, EARS, NOSE AND THROAT:  No headache, no dizziness, no blurring of vision, no photophobia. RESPIRATORY SYSTEM:  No cough, no shortness of breath, no hemoptysis. CARDIOVASCULAR SYSTEM:  No chest pain, orthopnea, no palpitations. GASTROINTESTINAL SYSTEM:  No nausea and vomiting, no diarrhea, no constipation. GENITOURINARY SYSTEM:  No dysuria, no urgency and no frequency. All other systems are reviewed and they are negative. PHYSICAL EXAMINATION:    GENERAL APPEARANCE:  The patient appeared ill, in moderate distress. VITAL SIGNS:  On arrival to the emergency room, temperature 99.2, pulse 65, respiratory rate 18, blood pressure 157/77, oxygen saturation 98% on room air. HEENT:  Normocephalic, atraumatic. EYES:  Normal eye movements. No redness, no drainage, no discharge. EARS:  Normal external ears with no obvious drainage. NOSE:  No deformity, no drainage. MOUTH AND THROAT:  No visible oral lesions. NECK:  Supple. No JVD, no thyromegaly. CHEST:  Clear breath sounds. No wheezing, no crackles. HEART:  Normal S1 and S2.  Regular. No clinically appreciable murmur. ABDOMEN:  Soft, nontender, normal bowel sounds. CENTRAL NERVOUS SYSTEM:  Alert, oriented x3. No gross focal neurological deficit. EXTREMITIES:  No edema. Pulses 2+ bilaterally. MUSCULOSKELETAL SYSTEM:  No obvious joint deformity or swelling. SKIN:  No active skin lesion seen in the exposed part of the body. PSYCHIATRIC:  Normal mood and affect. LYMPHATIC SYSTEM:  No cervical lymphadenopathy. DIAGNOSTIC DATA:  EKG shows normal sinus rhythm and nonspecific ST and T-wave abnormalities. CT scan of the lower extremity shows a pathological fracture to the right intertrochanteric region with soft tissue extension anteriorly. LABORATORY DATA:  C-reactive protein level 2.64. Chemistry:  Sodium 135, potassium 3.3, chloride 100, CO2 23, glucose 136, BUN 22, creatinine 0.65, calcium 8.8, bilirubin total 0.8, ALT 14, AST 18, alkaline phosphatase 60, total protein 7.9, albumin level 3.9, globulin 4.0. Hematology:  WBC 21.6, hemoglobin 11.7, hematocrit 35.6, platelet 512. ASSESSMENT:    1. Pathological fracture, right hip. 2.  Leukocytosis. 3.  Hyperglycemia. 4.  Hypokalemia. 5.  Elevated blood pressure. PLAN:    1. Pathological fracture, right hip. Will admit the patient for further evaluation and treatment. Will carry out pain control while awaiting further recommendation from the orthopedic service. Will obtain MRI of the right lower extremity as advised by the orthopedic service. Will also obtain a chest x-ray to evaluate the patient for mass lesion. Patient may require a CT scan of the chest if the chest x-ray is negative to evaluate for mass lesion. 2.  Leukocytosis. This is most likely as a result of exposure to steroid for the initial treatment of the right hip pain by her primary care physician. Patient is not septic, nontoxic, afebrile. Will obtain a chest x-ray, will check a urinalysis, will also check a lactic acid level. 3.  Hyperglycemia. The patient has no history of diabetes. Will check hemoglobin A1c level. 4.  Hypokalemia. Will replace potassium. Will repeat potassium level. Will also check a magnesium level. 5.  Elevated blood pressure. The patient has no history of hypertension. Will check a TSH level. Will monitor the patient's blood pressure closely. If average blood pressure reading remains elevated, the patient will require antihypertensive medication for new onset essential hypertension. OTHER ISSUES:  CODE STATUS:  THE PATIENT IS FULL CODE. Will place the patient on heparin for DVT prophylaxis.       MD JULIET Soares/  D: 07/13/2018 04:52 T: 07/13/2018 05:51  JOB #: [id number]  CC: JAZMIN Herrmann MD

## 2018-07-13 NOTE — ROUTINE PROCESS
Bedside shift change report given to ROHIT Rose (oncoming nurse) by Colleen Murcia RN(offgoing nurse). Report given with SBAR.

## 2018-07-13 NOTE — ED NOTES
Patient returned via stretcher from CT by Spool. Verbal shift change report given to [address] [place], [place] [address]way [address] (oncoming nurse) by Awilda Julien (offgoing nurse). Report included the following information SBAR and ED Summary.

## 2018-07-13 NOTE — PROGRESS NOTES
Hospitalist Progress Note Jaime Shirley MD 
Office: [phone] Date of Service:  2018 NAME:  Claudetta Santee :  1945 MRN:  [id number] Discussed care plan with ortho and CM Pt will need transfer to U and Dr. Luisana Esparza at [facility] will be accepting physicians Hospital Problems  Date Reviewed: 2018 Codes Class Noted POA * (Principal)Pathological fracture of right hip ([address] ICD-10-CM: T23.122J ICD-9-CM: 733.14  2018 Yes Pathologic fracture ICD-10-CM: M84.40XA ICD-9-CM: 733.10  2018 Unknown Review of Systems: A comprehensive review of systems was negative. Vital Signs:  
 Last 24hrs VS reviewed since prior progress note. Most recent are: 
Visit Vitals  /66  Pulse (!) 59  Temp 98.4 °F (36.9 °C)  Resp 16  SpO2 99% Physical Examination:  
 
 
     
   
   
Resp:  CTA bilaterally. CV:  Regular rhythm, normal rate, GI:  Soft, non distended, non tender. Musculoskeletal:  No edema, Neurologic:  Moves all extremities. PLAN:  
 
1. Pathologic fracture will need w/u and treatment for MRI and transfer to [facility] 2.  
______________________________________________________________________ EXPECTED LENGTH OF STAY: - - - 
ACTUAL LENGTH OF STAY:          0 Jaime Shirley MD

## 2018-07-13 NOTE — DISCHARGE INSTRUCTIONS
Discharge Instructions       PATIENT ID: Louis Barboza  MRN: [id number]   YOB: 1945    DATE OF ADMISSION: 7/12/2018  9:38 PM    DATE OF DISCHARGE: 7/13/2018    PRIMARY CARE PROVIDER: Almita Briceno MD     ATTENDING PHYSICIAN: Cuate Gaspar MD  DISCHARGING PROVIDER: Sherren Flasher, MD    To contact this individual call [phone] and ask the  to page. If unavailable ask to be transferred the Adult Hospitalist Department. DISCHARGE DIAGNOSES Pathological fracture    CONSULTATIONS: IP CONSULT TO ORTHOPEDIC SURGERY    PROCEDURES/SURGERIES: * No surgery found *    PENDING TEST RESULTS:   At the time of discharge the following test results are still pending:     FOLLOW UP APPOINTMENTS:   Follow-up Information     Follow up With Details Comments Contact Info    Almita Briceno MD In 4 weeks  [address]  [phone]             ADDITIONAL CARE RECOMMENDATIONS:     DIET: Cardiac Diet    ACTIVITY: Bedrest    WOUND CARE:     EQUIPMENT needed:       DISCHARGE MEDICATIONS:   See Medication Reconciliation Form    · It is important that you take the medication exactly as they are prescribed. · Keep your medication in the bottles provided by the pharmacist and keep a list of the medication names, dosages, and times to be taken in your wallet. · Do not take other medications without consulting your doctor. NOTIFY YOUR PHYSICIAN FOR ANY OF THE FOLLOWING:   Fever over 101 degrees for 24 hours. Chest pain, shortness of breath, fever, chills, nausea, vomiting, diarrhea, change in mentation, falling, weakness, bleeding. Severe pain or pain not relieved by medications. Or, any other signs or symptoms that you may have questions about.       DISPOSITION:    Home With:   OT  PT  HH  RN       SNF/Inpatient Rehab/LTAC    Independent/assisted living    Hospice   x Other: [facility]     CDMP Checked:   Yes x     PROBLEM LIST Updated:  Yes x       Signed:   Sherren Flasher, MD  7/13/2018  2:14 PM

## 2018-07-13 NOTE — ROUTINE PROCESS
TRANSFER - OUT REPORT:    Verbal report given to Mumtaz Walter RN(name) on Louis Barboza  being transferred to  (unit) for routine progression of care       Report consisted of patients Situation, Background, Assessment and   Recommendations(SBAR). Information from the following report(s) SBAR, ED Summary, [facility] and Recent Results was reviewed with the receiving nurse. Lines:   Peripheral IV 07/12/18 Right Antecubital (Active)   Site Assessment Clean, dry, & intact 7/12/2018  9:51 PM   Phlebitis Assessment 0 7/12/2018  9:51 PM   Infiltration Assessment 0 7/12/2018  9:51 PM   Dressing Status Clean, dry, & intact 7/12/2018  9:51 PM   Dressing Type Transparent 7/12/2018  9:51 PM   Hub Color/Line Status Pink 7/12/2018  9:51 PM        Opportunity for questions and clarification was provided.       Patient transported with:   [employer]

## 2018-07-13 NOTE — PROGRESS NOTES
TRANSFER - OUT REPORT:    Verbal report given to Geoffrey Patrick RN(name) on Carlos Elise  being transferred to [facility](unit) for care not available here    Report consisted of patients Situation, Background, Assessment and   Recommendations(SBAR). Information from the following report(s) SBAR, Kardex, Intake/Output and MAR was reviewed with the receiving nurse. Lines:   Peripheral IV 07/12/18 Right Antecubital (Active)   Site Assessment Clean, dry, & intact 7/13/2018  8:28 AM   Phlebitis Assessment 0 7/13/2018  8:28 AM   Infiltration Assessment 0 7/13/2018  8:28 AM   Dressing Status Clean, dry, & intact 7/13/2018  8:28 AM   Dressing Type Transparent 7/13/2018  8:28 AM   Hub Color/Line Status Infusing 7/13/2018  8:28 AM        Opportunity for questions and clarification was provided.       Patient transported with:   O2 @ 2 liters

## 2018-07-13 NOTE — PROGRESS NOTES
I have reviewed discharge instructions with the patient. The patient verbalized understanding. Pt send with copy of discharge, MAR, CD of MRI, and EMTALA. No further question at this time.

## 2018-07-13 NOTE — ED TRIAGE NOTES
Patient arrives via EMS from [address] [place] [address] in [facility] with cc of right hip pain that radiates to right foot. Patient reports seeing a PA on Wednesday on this week and being ambulate with a walker on Wednesday. Patient reports being unable to ambulate today. Patient denies recent fall or direct injury. Patient took 100mg tramadol today and Prednisone. Per EMS, patient was given 50mg Fentanyl en jennifer with minimum relief. EMS reports + PVS, A&Ox4.

## 2018-07-13 NOTE — PHYSICIAN ADVISORY
Short Stay Review       Pt Name:  Jinny Wilkinson   MR#  [id number]   CSN#   [id number]   Room and Hospital  570/01  @ [address] [place] [address]   Hospitalization date  7/12/2018  9:38 PM  No discharge date for patient encounter. Current Attending Physician  Mic Joyce MD     A discharge order has been placed for this episode of hospital care for Ms. Jinny Wilkinson; since this hospital stay is less than two midnights, I reviewed Ms. Lisa Jones's chart. Ms. Lisa Jones's healthcare insurance/benefit include:  Payor: [place] MEDICARE / Plan: VA MEDICARE PART A & B / Product Type: Medicare /     Utilization Review related case summary:   Age  68 y.o.   BMI  There is no height or weight on file to calculate BMI. PMHx includes  No significant medical hx . Hospital course  The pt is now being transferred to oncologic orthopedic services for suspicious lesion at her fracture site.      This is an exception of Shortstay per CMS guidelines    Risk of deterioration at the time She was hospitalized               On the basis of chart review, this patient's hospitalization status      is appropriate for Philippe Gayle MD MPH FACP   Physician Advisor    [address]   Utilization Review, Care Management         CSN:  [id number]   TIRSO:   [id number]  Admitted on :  7/12/2018   Discharge order

## 2018-08-08 ENCOUNTER — OFFICE VISIT (OUTPATIENT)
Dept: INTERNAL MEDICINE CLINIC | Age: 73
End: 2018-08-08

## 2018-08-08 VITALS
TEMPERATURE: 98.5 F | RESPIRATION RATE: 20 BRPM | HEART RATE: 74 BPM | HEIGHT: 61 IN | SYSTOLIC BLOOD PRESSURE: 123 MMHG | DIASTOLIC BLOOD PRESSURE: 66 MMHG | BODY MASS INDEX: 26.24 KG/M2 | WEIGHT: 139 LBS | OXYGEN SATURATION: 96 %

## 2018-08-08 DIAGNOSIS — M84.551A PATHOLOGICAL FRACTURE OF RIGHT HIP DUE TO NEOPLASTIC DISEASE, INITIAL ENCOUNTER (HCC): ICD-10-CM

## 2018-08-08 DIAGNOSIS — C54.1 ENDOMETRIAL CANCER (HCC): Primary | ICD-10-CM

## 2018-08-08 RX ORDER — OXYCODONE HYDROCHLORIDE 5 MG/1
TABLET ORAL
Refills: 0 | COMMUNITY
Start: 2018-07-31 | End: 2018-08-08 | Stop reason: ALTCHOICE

## 2018-08-08 RX ORDER — ENOXAPARIN SODIUM 100 MG/ML
INJECTION SUBCUTANEOUS
Refills: 0 | Status: ON HOLD | COMMUNITY
Start: 2018-07-31 | End: 2020-11-17

## 2018-08-08 RX ORDER — ACETAMINOPHEN 325 MG/1
975 TABLET ORAL
COMMUNITY
Start: 2018-07-30 | End: 2018-08-30

## 2018-08-08 NOTE — MR AVS SNAPSHOT
[address] [place] [address] [address] [place] [address] 
[phone] Patient: Varun Boss MRN: [id number] NPP:5/10/5149 Visit Information Date & Time Provider Department Dept. Phone Encounter #  
 8/8/2018 10:20 AM Riya Dye MD [facility] [phone] [id number] Upcoming Health Maintenance Date Due Hepatitis C Screening 1945 DTaP/Tdap/Td series (1 - Tdap) 4/25/1966 BREAST CANCER SCRN MAMMOGRAM 4/25/1995 FOBT Q 1 YEAR AGE 50-75 4/25/1995 ZOSTER VACCINE AGE 60> 2/25/2005 Bone Densitometry (Dexa) Screening 4/25/2010 Pneumococcal 65+ High/Highest Risk (1 of 2 - PCV13) 4/25/2010 GLAUCOMA SCREENING Q2Y 2/20/2015 MEDICARE YEARLY EXAM 3/14/2018 Influenza Age 5 to Adult 8/1/2018 Allergies as of 8/8/2018  Review Complete On: 8/8/2018 By: Riya Dye MD  
  
 Severity Noted Reaction Type Reactions Aleve [Naproxen Sodium]  02/08/2013    Nausea Only Current Immunizations  Never Reviewed No immunizations on file. Not reviewed this visit Vitals BP Pulse Temp Resp Height(growth percentile) Weight(growth percentile) 123/66 74 98.5 °F (36.9 °C) (Oral) 20 5' 1\" (1.549 m) 139 lb (63 kg) SpO2 BMI OB Status Smoking Status 96% 26.26 kg/m2 Postmenopausal Never Smoker Vitals History BMI and BSA Data Body Mass Index Body Surface Area  
 26.26 kg/m 2 1.65 m 2 Your Updated Medication List  
  
   
This list is accurate as of 8/8/18 11:10 AM.  Always use your most recent med list.  
  
  
  
  
 acetaminophen 325 mg tablet Commonly known as:  TYLENOL Take 975 mg by mouth.  
  
 enoxaparin 40 mg/0.4 mL Commonly known as:  LOVENOX INJECT CONTENTS OF 1 SYRINGE ( 40 MG ) UNDER THE SKIN ONCE DAILY FOR 16 DAYS FOR PREVENTION OF DVT/PT POST-OPERATIVE  
  
 traMADol 50 mg tablet Commonly known as:  Jocelynn Rose  
 Take 1 Tab by mouth every six (6) hours as needed for Pain. Max Daily Amount: 200 mg. Introducing [facility]! [employer] introduces [employer] patient portal. Now you can access parts of your medical record, email your doctor's office, and request medication refills online. 1. In your internet browser, go to [email or web address] 2. Click on the First Time User? Click Here link in the Sign In box. You will see the New Member Sign Up page. 3. Enter your [employer] Access Code exactly as it appears below. You will not need to use this code after youve completed the sign-up process. If you do not sign up before the expiration date, you must request a new code. · [employer] Access Code: [email or web address] Expires: 9/13/2018  2:28 PM 
 
4. Enter the last four digits of your Social Security Number (xxxx) and Date of Birth (mm/dd/yyyy) as indicated and click Submit. You will be taken to the next sign-up page. 5. Create a M[employer] ID. This will be your [employer] login ID and cannot be changed, so think of one that is secure and easy to remember. 6. Create a M[employer] password. You can change your password at any time. 7. Enter your Password Reset Question and Answer. This can be used at a later time if you forget your password. 8. Enter your e-mail address. You will receive e-mail notification when new information is available in [address] 9. Click Sign Up. You can now view and download portions of your medical record. 10. Click the Download Summary menu link to download a portable copy of your medical information. If you have questions, please visit the Frequently Asked Questions section of the [employer] website. Remember, [employer] is NOT to be used for urgent needs. For medical emergencies, dial 911. Now available from your iPhone and Android! Please provide this summary of care documentation to your next provider. Your primary care clinician is listed as JAZMIN FRAZIER. If you have any questions after today's visit, please call [phone].

## 2018-08-08 NOTE — PROGRESS NOTES
HISTORY OF PRESENT ILLNESS  Roque Liu is a 68 y.o. female. HPI   New patient. Previously seen by me > 5-6 years ago. Presented to [place] [address] with right pathologic hip fracture on 7/12/18. .  Transferred to [facility] on 7/13. Had THR with removal of mass/cancer. D/negro to rehab 7/20. D/negro home on 8/1. Has f/u with oncologist on Friday to discuss treatment for metastatic endometrial cancer. Spots in lungs suspicious for additional metastatic disease. Discussing chemo tx options. Since home in outpt PT twice a week. Decreased pain in hip, moving better. Stopped oxycodone. Taking just Tylenol and Tramadol. Swelling has decreased as well. Before fracture was feeling more tired and increased pain in right hip. Progressed from pain to walker at home. Would have some vaginal spotting but only when took advil \"for awhile\". Past Medical History:   Diagnosis Date    Endometrial cancer ([address]     mets to right hip and lungs     Past Surgical History:   Procedure Laterality Date    ABDOMEN SURGERY PROC UNLISTED      HX APPENDECTOMY  1975    HX HEENT  2006    OS Cataract    HX HIP REPLACEMENT Right      Allergies   Allergen Reactions    Aleve [Naproxen Sodium] Nausea Only       Current Outpatient Prescriptions:     enoxaparin (LOVENOX) 40 mg/0.4 mL, INJECT CONTENTS OF 1 SYRINGE ( 40 MG ) UNDER THE SKIN ONCE DAILY FOR 16 DAYS FOR PREVENTION OF DVT/PT POST-OPERATIVE, Disp: , Rfl: 0    acetaminophen (TYLENOL) 325 mg tablet, Take 975 mg by mouth., Disp: , Rfl:     oxyCODONE IR (ROXICODONE) 5 mg immediate release tablet, TAKE 1 TABLET EVERY 4 HOURS AS NEEDED FOR PAIN. FOR SEVERE PAIN MAY TAKE 2 TABLETS., Disp: , Rfl: 0    traMADol (ULTRAM) 50 mg tablet, Take 1 Tab by mouth every six (6) hours as needed for Pain.  Max Daily Amount: 200 mg., Disp: 15 Tab, Rfl: 0    Visit Vitals    /66    Pulse 74    Temp 98.5 °F (36.9 °C) (Oral)    Resp 20    Ht 5' 1\" (1.549 m)    Wt 139 lb (63 kg)    SpO2 96%    BMI 26.26 kg/m2        ROS  See above  Physical Exam   Constitutional: She appears well-developed and well-nourished. HENT:   Head: Normocephalic and atraumatic. Neck: Neck supple. No thyromegaly present. Cardiovascular: Normal rate, regular rhythm and normal heart sounds. Exam reveals no gallop and no friction rub. No murmur heard. Pulmonary/Chest: Effort normal and breath sounds normal.   Musculoskeletal:   Right hip incision well healed and nontender   Lymphadenopathy:     She has no cervical adenopathy. Vitals reviewed. ASSESSMENT and PLAN  Pathologic fracture right hip - s/p R THR. Doing well. Walking well with walker. Continue rehab. Taking tylenol and Tramadol for pain. Metastatic endometrial cancer - upcoming appointment with oncology at [facility] to discuss treatment options.     Orders Placed This Encounter    DISCONTD: oxyCODONE IR (ROXICODONE) 5 mg immediate release tablet    enoxaparin (LOVENOX) 40 mg/0.4 mL    acetaminophen (TYLENOL) 325 mg tablet     Follow-up Disposition: Not on File

## 2019-04-04 ENCOUNTER — HOSPITAL ENCOUNTER (OUTPATIENT)
Dept: PHYSICAL THERAPY | Age: 74
Discharge: HOME | End: 2019-04-04
Payer: MEDICARE

## 2019-04-04 PROCEDURE — 97161 PT EVAL LOW COMPLEX 20 MIN: CPT

## 2019-04-04 PROCEDURE — 97140 MANUAL THERAPY 1/> REGIONS: CPT

## 2019-04-04 PROCEDURE — 97110 THERAPEUTIC EXERCISES: CPT

## 2019-04-04 NOTE — PROGRESS NOTES
[facility]  Physical Therapy Lymphedema Clinic  [address] [facility], [address], [facility].    INITIAL EVALUATION    NAME: Jinny Wilkinson AGE: 68 y.o. GENDER: female  DATE: 4/4/2019  REFERRING PHYSICIAN: Dr. 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 Patient was diagnosed with endometrial cancer with a tumor in the right hip. She underwent a hip replacement in July of 2018, radiation treatments in September of 2018 and chemotherapy. She has been wearing thigh high compression on her right leg since November with reports of improved swelling from initial onset. She has been receiving physical therapy for her right hip and for her gait up until last week, but the swelling is limiting her ability to progress in her gait and mobility. Patient states she feels like she is sitting on something hard when she sits down and she works on it and it goes away but comes back,  She requires assistance with donning her stockings and she cannot reach her right foot for bathing and lotioning. She enjoys working in the garden but has difficulty bending down to reach the plants even when seated in a chair. [facility] Lymphedema Assessment Scale: Score 15 of out of 20.   Past Medical History:   Past Medical History:   Diagnosis Date    Endometrial cancer ([address]     mets to right hip and lungs  Arthritis       Past Surgical History:   Procedure Laterality Date    ABDOMEN SURGERY PROC UNLISTED      HX APPENDECTOMY  0248  HX HEENT  2006    OS Cataract    HX HIP REPLACEMENT Right 2018     Current Medications:    Current Outpatient Medications   Medication Sig     centrum silver  Calcium  tylenol           No current facility-administered medications for this encounter. 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 She enjoys gardening. Living Situation: Resides in a [place] Caregiver?: Yes Sister Linnette Avalos attended session today and is willing to learn how to assist her   Self-care/ADLs: Requires assistance for using compression stockings bathing her leg     Mobility: Ambulates with straight cane   Sleeping Arrangement:  Sleeps in hospital bed at night and elevates her leg   Adaptive Equipment Owned: straight cane, reacher, bathroom has walk in shower with shower chair, grab bars     Previous Therapy:  Recently completed physical therapy at [facility] for gait and mobility. No treatment for right lower extremity lymphedema is noted  Compression/Lymphedema Equipment:  Patient is wearing bilateral lower extremity Sig varis 30-40mmHg thigh highs with closed toes and silicone band, these stockings were received from another Nun in the [facility] after she had used them for an unknown period of time. They are still able to provide reasonable compression     SUBJECTIVE:  My swelling is better than it was. It was about twice this size before. My goal is to be able to put on my own stockings. Right now I need help from Godfrey Desai to put them on. Patients goals for therapy: To put on her own stockings, to increase range of motion, and no more fluid accumulation because the leg is too heavy to walk easily.     OBJECTIVE DATA SUMMARY:   EXAMINATION/PRESENTATION/DECISION MAKING: Pain: Patient pain level was not reported today. She reports heaviness, tightness in the right leg primarily in the thigh and hip region, but was unable to rate it for therapist today. Self-Care and ADLs:  Grooming: Modified independent  Bathing: Modified independent    UB Dressing: Modified independent  LB Dressing: Modified independent to assistance   Don/Doff Shoes/Socks: Modified independent to assistance, able to don and doff slip on shoes herself but she requires assistance of Sister Hetal Leigh to don her stockings on the lower legs to the knee, she is able to assist from there  Toileting: Modified independent           Skin and Tissue Assessment:  Dermal Status:  [x]   Intact []  Dry   []  Tenuous [] Flaky   []  Wound/lesion present [x]  Scars- right lateral hip   []  Dermatitis    Texture/Consistency:  []  Boggy []  Pitting Edema   [x]  Brawny moderately right thigh to knee, increased brawniness is noted in inguinal region []  Combination   [x]  Fibrotic/Woody     Pigmentation/Color Change:  []  Normal []  Hemosiderin   []  Red []  Erythematous   [x]  Hyperpigmented right leg []  Hyperlipodermatosclerosis   Anomalies:  []  Lymphorrhea []  Vesicles   []  Petechiae []  Warty Vercusis   []  Bullae []  Papilloma   Circulatory:  []  ARACELI []  Varicosities:   [x]  Pulses palpable bilateral lower extremities [x]  Vascular studies ruled out DVT in past   []  DVT History    Nails:  []   Normal  [x]   Fungus  Stemmers Sign: Negative  Height:    5'1\" Weight:      BMI:     (36 or greater: adversely affecting lymphedema)  Wound/Ulcer:  N/A    Wound Pain:   N/A  Volumetric Measurements:   Right:  7,651.24mL Left:  7,180.08mL   % Difference: 6.56% right larger than left Dominance: right handed   (See scanned graph)  No lower leg swelling is currently noted on the right side. Swelling and texture changes start in the knee and progress up to the hip and thigh and buttock region.  No swelling is observed in the left leg and patient does not reports noting any episodes of right leg swelling when asked. Range of Motion: Range of motion is limited by about 40% a the right hip and knee, and is WNL on at the right ankle and foot, Left lower extremity range of motion is [facility]. Swelling and swelling texture is limiting range of motion at the right knee for knee flexion. Strength: Decreased throughout right lower extremity especially at the knee and hip. Patient is able to perform all functional activities required in the clinic today except donning her stockings. Sensation:    [x] Intact  In feet and lower legs  [] Impaired  Mobility:    Bed/Chair Mobility:  Modified independent  Transfers:  Modified independent    Sitting Balance:   good Standing Balance:   Good with cane   Gait:  Patient ambulates with straight cane for community distances with decreased eduardo and step length Wheelchair Mobility:  N/A    Endurance:  good Stairs:  Step to pattern with straight cane       Safety:  Patient is alert and oriented:  Yes   Safety awareness:  Good   Fall Risk?:  Moderate secondary to decreased right leg mobility due to swelling and history of right hip replacement   Patient given written fall prevention handout: Yes   Precautions:  Standard lymphedema precautions to include avoiding blood pressure readings, injections and IVs or other procedures/acts that could lead to broken skin on affected area, and avoiding excessive heat, resistive activity or altitude without compression garment     Based on the above components, the patient evaluation is determined to be of the following complexity level: LOW     Evaluation Time: 2803-6237  40 minutes    TREATMENT PROVIDED:   1. Treatment description:  Therapeutic Exercise and Procedure: Patient instructed in activity restrictions and exercise guidelines.   Therapist demonstrated deep abdominal breathing with good patient performance x 5 repetitions with a request that patient perform this activity 5 times each waking hour. Patient is performing a home exercise program from her recent physical therapy that includes marching, hip and knee exercises and she is encouraged to continue with these activities. Educated patient and Sister Jasper Cotton in the importance of exercise to activate the muscle pump which the lymphatic system relies on to move lymphatic fluid out of the leg . Encouraged walking and continued performance of these exercises at this time   Treatment time:  7162-3149  Minutes: 15    2. Treatment description:  Manual Therapy: Patient instructed in skin care principles and anatomy of the lymphatic system. Therapist able to demonstrate manual lymphatic drainage techniques for the drainage of right lower extremity and skin care protocol: using low pH lotion. Patient is using her left foot to rub her lotion on her right lower leg since she cannot reach it herself. She applies it to her left plantar foot which she can reach and then rubs it onto her right lower leg. Instructed her to perform this technique in an upward manner in toward the right hip which would be the normal direction of fluid movement. She redemonstrated independent. Sister Jasper Cotton sometimes massages her leg so educated her in the light pressure and direction of massage by demonstrating basic stationary circles for her. She has been using a firmer technique. Discussed the difference between muscle massage and lymphatic massage. Educated them in the signs and symptoms of cellulitis and to contact her physician if any of these symptoms develop. Discussed treatment components and goals with them including the role of compression garments versus treatment with multi-layer bandaging. Discussed current compression garments which are helpful, but patient reports they gather behind her knee at times and it can be uncomfortable and that they are hard to get on.   Discussed the potential for other garments that may help her become more independent with donning. Discussed trunk compression with some shapewear that may help with lateral hip and buttocks swelling as they report they were unable to don pantyhose. Discussed compression garment pricing and they are currently investigating a fund which she has applied to to assist with some these expenses. We will wait and see if she will be accepted. Sister Avel Marte the [address] thigh highs with a [employer] at the [place]. When therapist redonned her stockings, also demonstrated the use of gloves to assist with donning. Patient and Sister Rosemarie Diane are agreeable to try full treatment with multi-layer bandaging. Treatment time:  7327-4834 Minutes: 40    ASSESSMENT:   Ave Montalvo is a 68 y.o. female who presents with right lower extremity, stage 2  Lymphedema secondary to a history of endometrial cancer with metastasis to the right hip resulting in a right hip replacement. Swelling is primarily present proximally from right trunk to the knee with no swelling currently present below the knee. This is most likely the result of her compression use preventing worsening swelling distally. Texture changes are present in the thigh and this swelling is limiting patient's mobility. Patient will benefit from complete decongestive therapy (CDT) including manual lymphatic drainage (MLD) technique, short-stretch textile bandages/compression system to decongest limb, and kinesiotaping as appropriate. Patient will receive instruction in proper skin care to recognize signs/symptoms of and prevent infection, therapeutic exercise, and self-MLD for independent home program and restorative lymphatic performance. This care is medically necessary due to the infection risk with lymphedema and to improve functional activities.   CDT is necessary to resolve swelling to allow patient to return to wearing normal clothes/footwear and prevent worsening of symptoms, such as venous stasis ulcerations, infections, or hospitalizations. Patient will be independent with home program strategies to allow improved ADL ability and mobility and to allow patient to return to greatest functional independence. Rehabilitation potential is considered to be Good. Factors which may influence rehabilitation potential include excellent support from [facility], but limited funds for compression purchasing. Patient will benefit from 8-12 physical therapy visits over 10-12 weeks to optimize improvement in these areas. PLAN OF CARE:   Recommendations and Planned Interventions:  Manual lymph drainage/complete decongestive therapy  Multi-layer compression bandaging (short-stretch)  Compression garment fitting/provision  Lymphedema therapeutic exercise  AROM/PROM/Strength/Coordination  Self-care training  Functional mobility training  Education in skin care and lymphedema precautions  Self-MLD education per home program  Self-bandaging education per home program  Caregiver education as needed  Wound care as needed     GOALS  Short term goals  Time frame: 5/20/19  1. Patient will demonstrate knowledge of signs/symptoms of infections/cellulitis and be independent in skin care to prevent cellulitis. 2.  Patient will demonstrate independence in lymphedema home program of therapeutic exercises to improve circulation and decongest limb to improve ADLs. 3.  Patient will tolerate multi-layer bandages (MLB) and show measureable decrease in limb volume to allow ordering of home compression system (daytime, nighttime garments and pump as needed). Long term goals  Time frame: 6/30/19  1. Pt will show improvement in the [facility] Lymphedema Assessment Scale by decreasing the score to 12 and thus allow improvement in patient's quality of life. 2.  Patient will be independent with don/doff of compression system and use in order to prevent reaccumulation of fluid at discharge.   3.  Pt will be independent in self-MLD and show stable limb volumes showing decongestion and pt. ready for transition to independent restorative phase of lymphedema therapy. Patient has participated in goal setting and agrees to work toward plan of care. Patient was instructed to call if questions or concerns arise. KX Modifier initiated today. Patient requiring skilled PT services for previously untreated diagnosis of right lower extremity lymphedema. Patient will need to continue with the plan of care. Thank you for this referral.   Aubrey Mayers, MSPT, CLT-MOLINA   Time Calculation: 95 mins    TREATMENT PLAN EFFECTIVE DATES:   4/4/2019 TO 6/30/19  I have read the above plan of care for [facility]. I certify the above prescribed services are required by this patient and are medically necessary.   The above plan of care has been developed in conjunction with the lymphedema/physical therapist.       Physician Signature: ____________________________________Date:______________                                              Dr. Eliza Verduzco

## 2019-05-07 ENCOUNTER — HOSPITAL ENCOUNTER (OUTPATIENT)
Dept: PHYSICAL THERAPY | Age: 74
Discharge: HOME | End: 2019-05-07
Payer: MEDICARE

## 2019-05-07 PROCEDURE — 97110 THERAPEUTIC EXERCISES: CPT

## 2019-05-07 PROCEDURE — 97140 MANUAL THERAPY 1/> REGIONS: CPT

## 2019-05-07 NOTE — PROGRESS NOTES
[facility]  Physical Therapy Lymphedema Clinic  [address], [address], [facility] [address].    LYmphedema Therapy  Visit: 2    []                  Daily note               [x]                 30 day/10th visit progress note    NAME: Alberta Mcfarlane  DATE: 5/7/2019    GOALS   Short term goals  Time frame: 5/20/19  1. Patient will demonstrate knowledge of signs/symptoms of infections/cellulitis and be independent in skin care to prevent cellulitis. Sister Steph Bourne is performing skin care for Vinay Prieto each morning. 2.  Patient will demonstrate independence in lymphedema home program of therapeutic exercises to improve circulation and decongest limb to improve ADLs. Patient is performing her deep breathing exercises and remedial exercise program and stretches daily. She continues to work on her walking program with improved gait observed today. 3.  Patient will tolerate multi-layer bandages (MLB) and show measureable decrease in limb volume to allow ordering of home compression system (daytime, nighttime garments and pump as needed). Goal will be discontinued as patient will not be pursuing multi-layer bandaging at this time. She was provided with day and night donated compression garments that fit her well today.       Long term goals  Time frame: 6/30/19  1. Pt will show improvement in the [facility] Lymphedema Assessment Scale by decreasing the score to 12 and thus allow improvement in patient's quality of life. Not reassessed today. 2.  Patient will be independent with don/doff of compression system and use in order to prevent reaccumulation of fluid at discharge. Patient was able to don her Solar[employer] garments in the clinic. She requires assistance form Sister Steph Bourne to don the stockings and they use a [employer].   3.  Pt will be independent in self-MLD and show stable limb volumes showing decongestion and pt. ready for transition to independent restorative phase of lymphedema therapy. Initiated educated in manual lymph drainage techniques today with good performance and provided them with a handout of the home program.  Right lower extremity limb volume has decreased by 730.07ml since evaluation. SUBJECTIVE REPORT:  Sister Alejandro Huitron reports that Sister Barb Doty has a \"tire\" of swelling along her proximal thigh each am that makes her leg stiff and makes it difficult don the stockings. Once Sister Barb Doty is up and walking and exercises the \"tire improves\" and the stockings fit better. Pain: Patient reports she does not have any sharp pains in her right leg but she does have a tolerable generalized aching in the thigh and tingling in her feet. Pain Scale 1: Numeric (0 - 10)     Pain Intensity 1: 5     Pain Location 1: Leg     Pain Orientation 1: Right     Patient Stated Pain Goal: 1     Gait:  Ambulates with a straight cane community distances with increased eduardo and step length versus last treatment session. []  Independent gait without any assistive device for community distances  ADLs:  Modified independent to assistance depending on the activity  Treatment Response:  Patient located some shapewear bike shorts which she has been wearing since she was seen in therapy last.  She is compliant with daily compression stocking wearing schedule. []   Patient reviewed packet received at evaluation  [x]   Patient completed home program as prescribed  []   Patient not fully compliant with home program to date  []   Patient waiting on compression garment arrival  Function: Sister Alejandro Huitron drives patient to her appointments and assists her with her care at the [facility]  [x]   Patient requiring less assistance with completion of home program  []   ADLs are requiring less assistance   []   Patient able to return to work/leisure activities  [facility] Lymphedema Assessment Scale:  Not reassessed. Weight: Not reassessed today.   TREATMENT AND OBJECTIVE DATA SUMMARY:   Patient/Family Education:      Educated in skin care: [x]   Skin care products  []   Hygiene  [x]  Prevention of cellulitis  []   Wound care     Educated in exercise: [x]   Walking program  []   Karol ball routine  []   Stick routine  [x]   ROM routine     Instructed in self MLD:   Written sequence given and reviewed with patient and caregiver as well as demonstration and instruction during MLD portion of the session. Instructed in don/doff of compression system:   []   Multi layer bandage (MLB) donning principles and wear precautions  [x]   Day garments- reviewed donning and doffing, laundering, use of gloves with donning and daytime wearing schedule. Demonstrated product use. [x]   Night garments- Educated in and demonstrrated donning and doffing of [employer] Tribute bike short for night use. Patient was able to don it independently. Educated her in garment laundering, wearing schedule and product role in reducing or preventing increased swelling at night.  (this has been a problem for patient in the proximal thigh)     Therapeutic Activity 0 minutes   Treatment time: N/A  Functional Mobility: Ambulates with straight cane, modified independent with transfers and bed mobility   Fall risk: Moderate due to right hip replacement and swelling     Therapeutic Exercise/Procedure 15  minutes   Treatment time: 6119-6020  Karol ball exercise program:    Stick exercise program: N/A   Free exercises/ROM: Reviewed deep abdominal breathing 10 repetitions x 2 with good patient performance today. Reviewed remedial home exercise program and discussed the benefits of stretching. Reviewed the role of the muscle pump in lymphatic fluid movement. Patient is presenting with improved gait and she was educated to continue to work on her ambulation.    Home program: Patient to perform daily to BID:  [x]   Skin care  [x]   Deep abdominal breathing  []   Exercise routine  [x]   Walking program  [x]   Rest in supine   []   Compression bandage  [x]   Compression garments  []   Vasopneumatic device  []   Wound care  [x]   Self MLD  []   Bring supplies to each therapy visit  []   Purchase necessary supplies  []   Weight loss program  []   Follow up with       Rationale: Exercise will increase the lymph angiomotoricity and tissue pressure of the skin and thus decrease swelling. Modalities 0 minutes   Treatment time: N/A  Vasopneumatic pump: None currently     Manual Lymphatic Drainage (MLD) 85 minutes   Treatment time: 7120-0939, 2875-0595  Area to decongest:    [] UE          []  Right     []  Left      [] Trunk      [x] LE             [x]  Right     [x]  Left      [x] Trunk         Sequence used and effectiveness: [] Secondary/Primary sequence for upper extremity with / without trunk involvement    [x] SecondaryPrimary sequence for lower extremities with trunk involvement     [x] Modifications were made to manual lymph drainage sequence to exclude cervical techniques secondary to patient's age    [x] Self MLD sequence/techniques/hand strokes were educated to both Sister Jelani Meraz and Abiola Tafoya following the home program handout. Reviewed step by step technique performance and they each redmonstrated the techniques following the handout. Skin/wound care/debridement: Skin is 100% intact today. Right thigh is minimally brawny in texture. Upper/Lower extremity compression: Patient arrived wearing her compression thigh highs and she obtained shapewear in the bike short style which is wearing daily. Discussed insurance status and right leg decreased limb volume and improved texture. Patient has been working hard on her exercises, walking and has been consistent with her compression use. Her swelling is decreased so patient and therapist are opting to pursue additional compression products and education in self management techniques for optimal long term management of swelling.     Therapist obtained a new shape wear bike short for patient which fit patient well today. Therapist also located a donated [employer] for night use from someone similar in size to [employer]. Patient does not have any insurance coverage for compression products and funds are limited. Fitted patient for the [employer] with a compression jacket with good fit noted. The product will be worn at night. Educated patient and Sister Adonis Herr in garment donning and doffing, laundering wearing schedule, the role of the product and patient was able to don the garment independent. Patient reports she has had a lot of soreness along her posterior proximal thigh and inferior buttocks and they feel this may help with that pain. Therapist also located a donated Jobst Opaque 20-30mmHg thigh high with closed toe in size small that fit patient well. She is currently wearing a used pair of thigh highs 30-40mmHg. Reviewed stocking donning and doffing and the use of gloves for donning (also provided them with a donated set of gloves today from [facility]). Reviewed garment laundering, donning and doffing and daytime wearing schedule. Patient left the clinic with the new bike short and thigh highs in place. Kinesiotaping: N/A   Girth/Volume measurement: Reassessed right lower extremity limb volume with significant decreases noted. TOTAL TREATMENT 100 mins     Circumferential Limb Measurements:  Bilateral lower extremities affected. Reassessed right lower extremity today. Right lower extremity limb volume is 6,921.17ml versus 7,651.24ml on evaluation 4/4/19. ASSESSMENT:   Treatment effectiveness and tolerance: Patient presents with significant decreases in right proximal thigh swelling, but she is reporting increased swelling over night. Therapist and patient are opting to pursue compression options and education versus multi layer bandaging.   Therapist has located a donated [employer] bike short that fit patient well today and some donated thigh highs with a shapewear short for daytime wear. Sister Ardine Buerger and Kerri Ríos were educated in product use today. Sister Ardine Buerger will work with the products over the next week and we will reassess her product use and product effectiveness as well as continue to educate in home management components. Patient was educated in manual lymph drainage techniques today. Will educate in Hope exercises next visit. Progress toward goals: See goals above. Short term goal 3 is discontinued. PLAN OF CARE:   Changes to the plan of care: Care plan is being modified as therapist is pursing compression options for patient versus multi-layer bandaging. Will continue to focus on education in self management techniques. Educate in Rampart ball exercises. Frequency: []  2 times a week  [x]  Weekly  []  Biweekly  []  Monthly   Other:      Wiliam Pals initiated today. Patient requiring skilled PT services. Patient will need to continue with the plan of care.      Yamilet Thompson, MSPT, CLLINDA-MOLINA

## 2019-05-10 ENCOUNTER — APPOINTMENT (OUTPATIENT)
Dept: PHYSICAL THERAPY | Age: 74
End: 2019-05-10
Payer: MEDICARE

## 2019-05-14 ENCOUNTER — HOSPITAL ENCOUNTER (OUTPATIENT)
Dept: PHYSICAL THERAPY | Age: 74
Discharge: HOME | End: 2019-05-14
Payer: MEDICARE

## 2019-05-14 PROCEDURE — 97110 THERAPEUTIC EXERCISES: CPT

## 2019-05-14 PROCEDURE — 97140 MANUAL THERAPY 1/> REGIONS: CPT

## 2019-05-14 NOTE — PROGRESS NOTES
[facility]  Physical Therapy Lymphedema Clinic  [address], [address], [facility] [address].    LYmphedema Therapy  Visit: 3    [x]                  Daily note               []                 30 day/10th visit progress note    NAME: Jagruti Murdock  DATE: 5/14/2019    GOALS   Short term goals  Time frame: 5/20/19  1. Patient will demonstrate knowledge of signs/symptoms of infections/cellulitis and be independent in skin care to prevent cellulitis. Sister Prasad Sanders is performing skin care for Leilani Max each morning using MLD principles. Both have been education on signs and symptoms of infection/cellulitis. Goal Met 5/14/19  2. Patient will demonstrate independence in lymphedema home program of therapeutic exercises to improve circulation and decongest limb to improve ADLs. Patient is performing her deep breathing exercises and remedial exercise program and stretches daily. She continues to work on her walking program with improved gait noted. Patient today was able to be issued a Viridiana Hand and the handout for the Viridiana Hand routine. Patient and Sister Prasad Sanders able to demonstrate good understanding of the routine today in the clinic. Patient will need assist from [facility] for portions of the exercise routine. Patient able to met this goal with the assistance of Sister Prasad Sanders as needed. Goal Met 5/20/19  3. Patient will tolerate multi-layer bandages (MLB) and show measureable decrease in limb volume to allow ordering of home compression system (daytime, nighttime garments and pump as needed). Goal will be discontinued as patient will not be pursuing multi-layer bandaging at this time. She was provided with day and night donated compression garments that fit her well today.       Long term goals  Time frame: 6/30/19  1.   Pt will show improvement in the [facility] Lymphedema Assessment Scale by decreasing the score to 12 and thus allow improvement in patient's quality of life. Not reassessed today. 2.  Patient will be independent with don/doff of compression system and use in order to prevent reaccumulation of fluid at discharge. Patient was able to don her [employer] garments in the clinic. She requires assistance form Sister Juan F Bansal to don the stockings and they use a [employer]. 3.  Pt will be independent in self-MLD and show stable limb volumes showing decongestion and pt. ready for transition to independent restorative phase of lymphedema therapy. Continued educated in manual lymph drainage techniques today with good performance and provided them with a handout of the home program on previous visit. Sister Juan F Vaughant still requiring some further education in proper hand placement and techniques. Patient able to demonstrate independence in the trunk portion and inguinal techniques. Full volumes taken today reveal that patient has lost another 531 ml on the R LE since last visit 5/7/19. Patient has lost a total of 1261 ml on the R LE and 727 ml on the L LE since evaluation. Today patient's percent difference was -0.98% compared to 6.56% on evaluation on 4/4/19. Will assess once more prior to discharge as she was issued additional compression garments and we will assess effectiveness. SUBJECTIVE REPORT: Patient continues to report that she has proximal thigh swelling that causes her at times difficulty with mobilizing. Patient and Sister Juan F Bansal (caregiver) educated in improving the fit and donning of thigh high garments on the upper thigh to better contain the proximal thigh swelling. Patient is utilizing her garments as recommended with the assistance of Sister Juan F Bansal. Pain: Patient reports she does not have any sharp pains in her right leg but she does have a tolerable generalized aching in the thigh and tingling in her feet. Gait:  Ambulates with a straight cane community distances with increased eduardo and step length from previous visits. ADLs:  Modified independent to assistance depending on the activity. Patient is requiring assistance of Sister Jasper Cotton for donning thigh highs at this time. Treatment Response:  []   Patient reviewed packet received at evaluation  [x]   Patient completed home program as prescribed (performing all aspects of home program daily as instructed)  []   Patient not fully compliant with home program to date  []   Patient waiting on compression garment arrival  Function: Sister Jasper Cotton drives patient to her appointments and assists her with her care at the [facility]  [x]   Patient requiring less assistance with completion of home program  []   ADLs are requiring less assistance   []   Patient able to return to work/leisure activities  [facility] Lymphedema Assessment Scale:  Not reassessed. Weight: Not reassessed today. TREATMENT AND OBJECTIVE DATA SUMMARY:   Patient/Family Education:      Educated in skin care: [x]   Skin care products  []   Hygiene  [x]  Prevention of cellulitis  []   Wound care     Educated in exercise: [x]   Walking program  [x]   Karol ball routine  []   Stick routine  [x]   ROM routine     Instructed in self MLD: Written sequence given and reviewed with patient and caregiver as well as demonstration and instruction during MLD portion of the session. Instructed in don/doff of compression system:   []   Multi layer bandage (MLB) donning principles and wear precautions  [x]   Day garments-Jobst Opaque 20-30mmHg thigh high with closed toe in size small and today issued Jobst Relief in small 15-20 mmHg)  Compression biker shorts. [x]   Night garments-[employer] Tribute bike short for night use     Therapeutic Activity 0 minutes   Treatment time: N/A  Functional Mobility: Ambulates with straight cane, modified independent with transfers and bed mobility   Fall risk:  Moderate due to right hip replacement and swelling     Therapeutic Exercise/Procedure 10  minutes   Treatment time: 13:40pm- 13:50pm  Karol ball exercise program: Patient able to perform the entire Geri Dejesus routine today in supine and seated position x 5 reps. Patient does need assistance with ball placement, but is independent otherwise with routine. Sister Bozena Baptiste will be able to assist. Geri Dejesus issued for patient to use in the [place]. Free exercises/ROM: Reviewed deep abdominal breathing. Reviewed remedial home exercise program and discussed the benefits of stretching. Reviewed the role of the muscle pump in lymphatic fluid movement. Patient is presenting with improved gait and she was educated to continue to work on her ambulation. Home program: Patient to perform daily to BID:  [x]   Skin care  [x]   Deep abdominal breathing  [x]   Exercise routine  [x]   Walking program  [x]   Rest in supine   []   Compression bandage  [x]   Compression garments  []   Vasopneumatic device  []   Wound care  [x]   Self MLD  [x]   Bring supplies to each therapy visit  []   Purchase necessary supplies  []   Weight loss program  []   Follow up with     Rationale: Exercise will increase the lymph angiomotoricity and tissue pressure of the skin and thus decrease swelling. Modalities 0 minutes   Treatment time: N/A  Vasopneumatic pump: Deferred      Manual Lymphatic Drainage (MLD) 70 minutes   Treatment time: 12:30pm-13:40pm  Area to decongest:    [] UE          []  Right     []  Left      [] Trunk    [x] LE             [x]  Right     [x]  Left      [x] Trunk     Sequence used and effectiveness: [] Secondary/Primary sequence for upper extremity with / without trunk involvement    [x] Secondary sequence for lower extremities with trunk involvement (focus on trunk and R LE) in supine.      [x] Modifications were made to manual lymph drainage sequence to exclude cervical techniques secondary to patient's age    [x] Self MLD sequence/techniques/hand strokes were educated to both Sister Yadira Sanchez and Vannessa Solis following the home program handout. Reviewed/Demonstrated step by step technique performance and they each re-dmonstrated the techniques following the handout/verbal instructions given by therapist as they had several questions today to make sure they were performing packet correctly. Skin/wound care/debridement: Skin is 100% intact today. Right thigh is minimally brawny in texture. Upper/Lower extremity compression: Patient arrived wearing her new compression thigh highs and shapewear in the bike short style which she is wearing daily. Sister Navjot Carrillo assists her with donning the thigh highs. Last visit therapist obtained a new shape wear bike short for patient and it fit well. Therapist also located a donated [employer] for night use from someone similar in size to [employer]. Patient does not have any insurance coverage for compression products and funds are limited. Fitted patient for the [employer] with a compression jacket with good fit noted. Educated patient and Sister Navjot Carrillo in garment donning and doffing, laundering wearing schedule, the role of the product and patient was able to don the garment independent. Patient has been wearing the product every night and can see improvement in the swelling in the am. Patient reports the night time garments has improved the soreness along her posterior proximal thigh and inferior buttocks. Patient has pair of  Jobst Opaque 20-30mmHg thigh high with closed toe in size small that was previously issued. She also has an older pair of thigh highs 30-40mmHg. Reviewed stocking donning and doffing and the use of gloves for donning (also provided them with a donated set of gloves from [facility]). Reviewed garment laundering, donning and doffing and daytime wearing schedule. Since last visit another pair of new thigh highs were found that will fit patient that are a new donation from [employer] ([employer] in small 15-20 mmHg).  These thigh highs fit patient well and will complete her compression day time needs at this time so that she has 2 new pairs of well fitting thigh highs for wash and wear during the restorative phase of care. Patient left the clinic with her older bike short and new thigh highs in place. Kinesiotaping: N/A   Girth/Volume measurement: Full volumes taken today reveal that patient has lost another 531 ml on the R LE since last visit 5/7/19. Patient has lost a total of 1261 ml on the R LE and 727 ml on the L LE since evaluation. Today patient's percent difference was -0.98% compared to 6.56% on evaluation on 4/4/19. TOTAL TREATMENT 80 mins     Circumferential Limb Measurements:  Full volumes taken today see above for details. ASSESSMENT:   Treatment effectiveness and tolerance: Patient continues to show improvement in reduction of her swelling from evaluation on 4/4/19. Patient now has needed day and night time garments that were able to be provided by donation as patient has no garment coverage with her Medicare plan. Sister Vicky Lopez and Sister Valeri Cogan have been educated in skin care, manual lymph drainage, exercise and compression garment usage to date. Sister Valeri Cogan continues to need to assist patient with portions of her home program. Will have them return for one more visit next week to ensure that there are no concerns with new garments issued and to make sure they are independent with complete home program. Today they still had several questions and needed some techniques corrected on Self MLD and in placement of the top band of thigh highs to improve effectiveness of managing upper thigh swelling. Will have patient and Sister Valeri Cogan return next week for final assessment and will discharge to the restorative phase of care at that time. Progress toward goals: See goals above. Short term goal 3 was discontinued. STG #1+#1 met today. Will continue to work towards remaining goals.        PLAN OF CARE:   Changes to the plan of care: Continue with plan of care    Frequency: []  2 times a week  [x]  Weekly  []  Biweekly  []  Monthly   Other:      Jewel Sampson applied today. Patient requiring skilled PT services. Patient will need to continue with the plan of care.     Ramone Vargas, PTA, CLT

## 2019-05-17 ENCOUNTER — APPOINTMENT (OUTPATIENT)
Dept: PHYSICAL THERAPY | Age: 74
End: 2019-05-17
Payer: MEDICARE

## 2019-05-21 ENCOUNTER — HOSPITAL ENCOUNTER (OUTPATIENT)
Dept: PHYSICAL THERAPY | Age: 74
Discharge: HOME | End: 2019-05-21
Payer: MEDICARE

## 2019-05-21 VITALS — BODY MASS INDEX: 25.98 KG/M2 | WEIGHT: 137.5 LBS

## 2019-05-21 PROCEDURE — 97140 MANUAL THERAPY 1/> REGIONS: CPT

## 2019-05-21 PROCEDURE — 97110 THERAPEUTIC EXERCISES: CPT

## 2019-05-21 NOTE — PROGRESS NOTES
[facility]  Physical Therapy Lymphedema Clinic  [address], [address], [place]  [address].    LYmphedema Therapy  Visit: 4    []                  Daily note               [x]       Discharge Summary/progress note    NAME: Yamileth Magana  DATE: 5/21/2019    GOALS   Short term goals  Time frame: 5/20/19  1. Patient will demonstrate knowledge of signs/symptoms of infections/cellulitis and be independent in skin care to prevent cellulitis. Sister Mili Adornoarns is performing skin care for Celeste Scanlon each morning using MLD principles. Both have been education on signs and symptoms of infection/cellulitis. Goal Met 5/14/19  2. Patient will demonstrate independence in lymphedema home program of therapeutic exercises to improve circulation and decongest limb to improve ADLs. Patient is performing her deep breathing exercises and remedial exercise program and stretches daily. She continues to work on her walking program with improved gait noted. Patient today was able to be issued a [facility] and the handout for the [facility] routine. Patient and Sister Lionelerinn Lozano able to demonstrate good understanding of the routine today in the clinic. Patient will need assist from [facility] for portions of the exercise routine. Patient able to met this goal with the assistance of Sister Mili Blake as needed. Goal Met 5/20/19  3. Patient will tolerate multi-layer bandages (MLB) and show measureable decrease in limb volume to allow ordering of home compression system (daytime, nighttime garments and pump as needed). Goal will be discontinued as patient will not be pursuing multi-layer bandaging at this time. She was provided with day and night donated compression garments that fit her well today.       Long term goals  Time frame: 6/30/19  1.   Pt will show improvement in the [facility] Lymphedema Assessment Scale by decreasing the score to 12 and thus allow improvement in patient's quality of life. Not reassessed today. 2.  Patient will be independent with don/doff of compression system and use in order to prevent reaccumulation of fluid at discharge. Patient was able to don her [employer] garments in the clinic. She requires assistance form Sister Burton Ferguson to don the stockings and they use a [employer]. 3.  Pt will be independent in self-MLD and show stable limb volumes showing decongestion and pt. ready for transition to independent restorative phase of lymphedema therapy. Continued educated in manual lymph drainage techniques today with good performance and provided them with a handout of the home program on previous visit. Sister Burton Ferguson still requiring some further education in proper hand placement and techniques. Patient able to demonstrate independence in the trunk portion and inguinal techniques. Full volumes taken today reveal that patient has lost another 531 ml on the R LE since last visit 5/7/19. Patient has lost a total of 1261 ml on the R LE and 727 ml on the L LE since evaluation. Today patient's percent difference was -0.98% compared to 6.56% on evaluation on 4/4/19. Will assess once more prior to discharge as she was issued additional compression garments and we will assess effectiveness. SUBJECTIVE REPORT: Patient continues to report that she has proximal thigh swelling that causes her at times difficulty with mobilizing. Patient and Sister Burton Ferguson (caregiver) educated in improving the fit and donning of thigh high garments on the upper thigh to better contain the proximal thigh swelling. Patient is utilizing her garments as recommended with the assistance of Sister Burton Ferguson. Pain: Patient reports she does not have any sharp pains in her right leg but she does have a tolerable generalized aching in the thigh and tingling in her feet. Gait:  Ambulates with a straight cane community distances with increased eduardo and step length from previous visits.    ADLs: Modified independent to assistance depending on the activity. Patient is requiring assistance of Sister Diana Omer for donning thigh highs at this time. Treatment Response:  []   Patient reviewed packet received at evaluation  [x]   Patient completed home program as prescribed (performing all aspects of home program daily as instructed)  []   Patient not fully compliant with home program to date  []   Patient waiting on compression garment arrival  Function: Sister Diana Omer drives patient to her appointments and assists her with her care at the [facility]  [x]   Patient requiring less assistance with completion of home program  []   ADLs are requiring less assistance   []   Patient able to return to work/leisure activities  [facility] Lymphedema Assessment Scale:  Not reassessed. Weight: Not reassessed today. TREATMENT AND OBJECTIVE DATA SUMMARY:   Patient/Family Education:      Educated in skin care: [x]   Skin care products  []   Hygiene  [x]  Prevention of cellulitis  []   Wound care     Educated in exercise: [x]   Walking program  [x]   Karol ball routine  []   Stick routine  [x]   ROM routine     Instructed in self MLD: Written sequence given and reviewed with patient and caregiver as well as demonstration and instruction during MLD portion of the session. Instructed in don/doff of compression system:   []   Multi layer bandage (MLB) donning principles and wear precautions  [x]   Day garments-Jobst Opaque 20-30mmHg thigh high with closed toe in size small and today issued Jobst Relief in small 15-20 mmHg)  Compression biker shorts. [x]   Night garments-[employer] bike short for night use     Therapeutic Activity 0 minutes   Treatment time: N/A  Functional Mobility: Ambulates with straight cane, modified independent with transfers and bed mobility   Fall risk:  Moderate due to right hip replacement and swelling     Therapeutic Exercise/Procedure 15  minutes   Treatment time: 12:40pm-12:55pm  [place] ball exercise program: Patient able to perform the entire Madhavi Jey routine today in supine and seated position x 5 reps. Patient does need assistance with ball placement, but is independent otherwise with routine. Sister Burton Ferguson will be able to assist. Madhavi Khanna issued for patient to use in the Walker. Free exercises/ROM: Reviewed deep abdominal breathing. Reviewed remedial home exercise program and discussed the benefits of stretching. Reviewed the role of the muscle pump in lymphatic fluid movement. Patient is presenting with improved gait and she was educated to continue to work on her ambulation. Home program: Patient to perform daily to BID:  [x]   Skin care  [x]   Deep abdominal breathing  [x]   Exercise routine  [x]   Walking program  [x]   Rest in supine   []   Compression bandage  [x]   Compression garments  []   Vasopneumatic device  []   Wound care  [x]   Self MLD  [x]   Bring supplies to each therapy visit  []   Purchase necessary supplies  []   Weight loss program  []   Follow up with     Rationale: Exercise will increase the lymph angiomotoricity and tissue pressure of the skin and thus decrease swelling. Modalities 0 minutes   Treatment time: N/A  Vasopneumatic pump: Deferred      Manual Lymphatic Drainage (MLD) 45 minutes   Treatment time: 12:55pm-1:40pm  Area to decongest:    [] UE          []  Right     []  Left      [] Trunk    [x] LE             [x]  Right     [x]  Left      [x] Trunk     Sequence used and effectiveness: [] Secondary/Primary sequence for upper extremity with / without trunk involvement    [x] Secondary sequence for lower extremities with trunk involvement (focus on trunk and R LE) in supine. [x] Modifications were made to manual lymph drainage sequence to exclude cervical techniques secondary to patient's age    [x] Self MLD sequence/techniques/hand strokes were educated to both Sister Robert Mgjed and Tri Ann following the home program handout. Reviewed/Demonstrated step by step technique performance and they each re-dmonstrated the techniques following the handout/verbal instructions given by therapist as they had several questions today to make sure they were performing packet correctly. Skin/wound care/debridement: Skin is 100% intact today. Right thigh is minimally brawny in texture. Upper/Lower extremity compression: Patient arrived wearing her new compression thigh highs and shapewear in the bike short style which she is wearing daily. Sister [facility] BURAK assists her with donning the thigh highs. Last visit therapist obtained a new shape wear bike short for patient and it fit well. Therapist also located a donated [employer] for night use from someone similar in size to [employer]. Patient does not have any insurance coverage for compression products and funds are limited. Fitted patient for the [employer] with a compression jacket with good fit noted. Educated patient and Sister [facility] BURAK in garment donning and doffing, laundering wearing schedule, the role of the product and patient was able to don the garment independent. Patient has been wearing the product every night and can see improvement in the swelling in the am. Patient reports the night time garments has improved the soreness along her posterior proximal thigh and inferior buttocks. Patient has pair of  Jobst Opaque 20-30mmHg thigh high with closed toe in size small that was previously issued. She also has an older pair of thigh highs 30-40mmHg. Reviewed stocking donning and doffing and the use of gloves for donning (also provided them with a donated set of gloves from [facility]). Reviewed garment laundering, donning and doffing and daytime wearing schedule. Since last visit another pair of new thigh highs were found that will fit patient that are a new donation from [employer] (Jobst Relief in small 15-20 mmHg).  These thigh highs fit patient well and will complete her compression day time needs at this time so that she has 2 new pairs of well fitting thigh highs for wash and wear during the restorative phase of care. Patient left the clinic with her older bike short and new thigh highs in place. Kinesiotaping: N/A   Girth/Volume measurement: Full volumes taken today reveal that patient has lost another 531 ml on the R LE since last visit 5/7/19. Patient has lost a total of 1261 ml on the R LE and 727 ml on the L LE since evaluation. Today patient's percent difference was -0.98% compared to 6.56% on evaluation on 4/4/19. TOTAL TREATMENT 60 mins     Circumferential Limb Measurements:  Full volumes taken today see above for details. ASSESSMENT:   Treatment effectiveness and tolerance: Patient continues to show improvement in reduction of her swelling from evaluation on 4/4/19. Patient now has needed day and night time garments that were able to be provided by donation as patient has no garment coverage with her Medicare plan. Sister Juan C Ovallessta and Sister Hetal Leigh have been educated in skin care, manual lymph drainage, exercise and compression garment usage to date. Sister Hetal Leigh continues to need to assist patient with portions of her home program. Will have them return for one more visit next week to ensure that there are no concerns with new garments issued and to make sure they are independent with complete home program. Today they still had several questions and needed some techniques corrected on Self MLD and in placement of the top band of thigh highs to improve effectiveness of managing upper thigh swelling. Will have patient and Sister Hetal Leigh return next week for final assessment and will discharge to the restorative phase of care at that time. Progress toward goals: See goals above. Short term goal 3 was discontinued. STG #1+#1 met today. Will continue to work towards remaining goals.        PLAN OF CARE:   Changes to the plan of care: Continue with plan of care Frequency: []  2 times a week  [x]  Weekly  []  Biweekly  []  Monthly   Other:      Monie Ford applied today. Patient requiring skilled PT services. Patient will need to continue with the plan of care.     Ramone Vargas, PTA, CLT

## 2019-05-24 ENCOUNTER — APPOINTMENT (OUTPATIENT)
Dept: PHYSICAL THERAPY | Age: 74
End: 2019-05-24
Payer: MEDICARE

## 2020-01-02 ENCOUNTER — HOSPITAL ENCOUNTER (OUTPATIENT)
Dept: PHYSICAL THERAPY | Age: 75
Discharge: HOME | End: 2020-01-02
Payer: MEDICARE

## 2020-01-02 VITALS — BODY MASS INDEX: 25.43 KG/M2 | WEIGHT: 134.6 LBS

## 2020-01-02 PROCEDURE — 97161 PT EVAL LOW COMPLEX 20 MIN: CPT

## 2020-01-02 PROCEDURE — 97140 MANUAL THERAPY 1/> REGIONS: CPT

## 2020-01-02 PROCEDURE — 97110 THERAPEUTIC EXERCISES: CPT

## 2020-01-02 NOTE — PROGRESS NOTES
[facility]  Physical Therapy Lymphedema Clinic  [address], [address], [address]    INITIAL EVALUATION    NAME: Koko Cesar AGE: 76 y.o. GENDER: female  DATE: 1/2/2020  REFERRING PHYSICIAN: Dr. 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 Patient is now walking without an assistive device, she wears compression garments daily and exercises daily. Patient recently received 2 pairs of fairly new thigh high compression stockings, 30-40mmHg. Right lower extremity swelling was first noted in November of 2018. Patient was diagnosed with endometrial cancer with a tumor in the right hip. She underwent a hip replacement in July of 2018, radiation treatments in September of 2018 and chemotherapy. [facility] Lymphedema Assessment Scale: Score 2 of out of 20. Past Medical History:   Past Medical History:   Diagnosis Date    Endometrial cancer ([address]     mets to right hip and lungs  Arthritis       Past Surgical History:   Procedure Laterality Date    ABDOMEN SURGERY PROC UNLISTED      HX APPENDECTOMY  1975    HX HEENT  2006    OS Cataract    HX HIP REPLACEMENT Right 2018     Current Medications:    Current Outpatient Medications   Medication Sig     centrum silver  Calcium 600mg  tylenol     Vitamin D3 25mg (1000 iu)  Anasrtozole 1 mg      No current facility-administered medications for this encounter.       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 She enjoys gardening. Living Situation: Resides in a [place] Caregiver?: Yes Sister Mili Lozano attended session today and is willing to learn how to assist her   Self-care/ADLs: Requires assistance for using compression stockings     Mobility: Ambulates without assistive device   Sleeping Arrangement:  Sleeps in hospital bed at night and elevates her leg   Adaptive Equipment Owned: straight cane, reacher, bathroom has walk in shower with shower chair, grab bars     Previous Therapy:  Recently completed physical therapy at [facility] [place] for gait and mobility. No treatment for right lower extremity lymphedema is noted  Compression/Lymphedema Equipment:  Patient is wearing bilateral lower extremity Sig varis 30-40mmHg thigh highs with closed toes and silicone band,  2 pairs. Patient has [employer] tribute with compression jacket that she wears each night    SUBJECTIVE:  My swelling is doing good. I do not notice any swelling in the left leg and I just got some new thigh high stockings from . Patients goals for therapy: To see if she could be discharged today.     OBJECTIVE DATA SUMMARY:   EXAMINATION/PRESENTATION/DECISION MAKING:   Pain:  No complaints of pain today  Pain Scale 1: Numeric (0 - 10)     Pain Intensity 1: 0     Pain Location 1: Leg     Pain Orientation 1: Right     Patient Stated Pain Goal: 0       Self-Care and ADLs:  Grooming: Modified independent  Bathing: Modified independent    UB Dressing: Modified independent  LB Dressing: Modified independent    Don/Doff Shoes/Socks: Modified independent to assistance, able to don and doff slip on shoes herself using a long handled shoe horn but she requires assistance of Sister Cinthya Hatfield to don her stockings on the lower legs to the knee, she is able to pull them on from there  Toileting: Modified independent           Skin and Tissue Assessment:  Dermal Status:  [x]   Intact []  Dry   []  Tenuous [] Flaky   []  Wound/lesion present [x]  Scars- right lateral hip   []  Dermatitis    Texture/Consistency:  [x]  Boggy right knee and thigh []  Pitting Edema   []  Brawny   []  Combination   []  Fibrotic/Woody     Pigmentation/Color Change:  []  Normal []  Hemosiderin   []  Red []  Erythematous   [x]  Hyperpigmented right leg []  Hyperlipodermatosclerosis   Anomalies:  []  Lymphorrhea []  Vesicles   []  Petechiae []  Warty Vercusis   []  Bullae []  Papilloma   Circulatory:  []  ARACELI []  Varicosities:   []  Pulses palpable bilateral lower extremities [x]  Vascular studies ruled out DVT in past   []  DVT History    Nails:  []   Normal  [x]   Fungus  Stemmers Sign: Negative  Height:    5'1\" Weight:  Weight: 61.1 kg (134 lb 9.6 oz)   BMI:     (36 or greater: adversely affecting lymphedema)  Wound/Ulcer:  N/A    Wound Pain:   N/A  Volumetric Measurements: Right leg affected  Right:  6,700.28mL (increased by 40.58ml since last assessment 5/21/19 but decreased by 950. 96ml since 4/4/19. Left:  6,785.79mL (increased by 258. 39ml since 5/21/19 but decreased by 394.29ml since evaluation 4/4/19.   % Difference: 1.26% left leg larger than right Dominance: right handed   (See scanned graph)  Mild swelling and texture changes start in the knee and progress up to the hip and thigh on the right leg. No swelling is observed in the left leg and patient does not reports noting any episodes of right leg swelling when asked. The left leg is now slightly larger than the affected right leg and right leg limb volume is essentially stable at this time. Range of Motion: Range of motion is now functional bilaterally and patient is able to complete all activities in the clinic independent.   She does exhibit some decreased ability to reach her distal foot and toes. Strength: Decreased at right hip as she fatigues more quickly with her hip exercises on the right versus the left side. Sensation:    [x] Intact  In feet and lower legs  [] Impaired  Mobility:    Bed/Chair Mobility:  Modified independent  Transfers:  Modified independent    Sitting Balance:   good Standing Balance:   Good     Gait:  Patient ambulates without an assistive device for community distances with a slight limp Wheelchair Mobility:  N/A    Endurance:  good Stairs:   Step over step down and either step to patter or step over step pattern going up the steps depending on the railing location       Safety:  Patient is alert and oriented:  Yes   Safety awareness:  Good   Fall Risk?:  Moderate secondary to decreased right leg mobility due to swelling and history of right hip replacement   Patient given written fall prevention handout: Yes   Precautions:  Standard lymphedema precautions to include avoiding blood pressure readings, injections and IVs or other procedures/acts that could lead to broken skin on affected area, and avoiding excessive heat, resistive activity or altitude without compression garment     Based on the above components, the patient evaluation is determined to be of the following complexity level: LOW     Evaluation Time: 3203-9758  30 minutes    TREATMENT PROVIDED:   1. Treatment description:  Therapeutic Exercise and Procedure: Patient instructed in activity restrictions and exercise guidelines. Patient brought in her [facility] Julio which requires more air and therapist filled it back up for her. She reports she is performing primarily supine Karol ball exercises twice a day but not really doing the seated exercises. She is also performing the exercises once she removes the stockings so discussed the benefits of performing exercises with garments in place and the benefits of the compression with the exercise.   Discussed their home schedule as Sister Bozena Baptiste removes and dons the stockings for Ofelia Johnson. Reviewed seated and supine Like.fm exercise program with 10 repetitions of each exercise and instructions for patient to perform the exercise in sequence following the handout. Patient is able to place the ball herself. Modified the figure 8 exercise in sitting to marching. Patient reports she now sits a lot to do her work and does not walk around a lot. Discussed the role of the muscle pump in lymphatic fluid movement. Discussed getting up to walk around every 45 minutes even if it is to get water or go to the bathroom. Encouraged walking and continued performance of these exercises at this time   Treatment time:  1783-7216  Minutes: 15    2. Treatment description:  Manual Therapy: Patient instructed in skin care principles and anatomy of the lymphatic system. Patient able to demonstrate manual lymphatic drainage techniques for the drainage of right lower extremity and skin care protocol: using low pH lotion. Patient is unsure which lotion she is using and reports she only applies lotion after she bathes. Provided them with a copy of the skin care recommendations for the home management program and reviewed soap and lotion recommendations with them and why they are important. Reviewed how to care for a a skin injury. Reviewed the signs and symptoms of cellulitis and to contact her physician if any of these symptoms develop. Discussed compression garments and patient is continuing to wear her shapewear with thigh high compression garments. She arrived wearing a good fairly new pair of size small short Sig varis thigh highs compression class 2. Garments fit well. Patient received these thigh high from another nun along with a another pair. Therapist was able to locate another pair of thigh highs, Medi plus with closed toe and silicone band size 3 with good fit noted. Garments were donned by therapist bilaterally. Reviewed proper garment placement with rebecca. Patient now has 3 good pairs of thigh high compression garments for home use. Discussed re-evaluations and patient is interested in waiting a year to return to the clinic as she is managing well and performing her program.  Therapist agreed but encouraged Sister Prasad Sanders and Leilani Max to call the clinic if she has any problems or needs prior to that scheduled follow-up appt. They verbalized understanding. Treatment time:  4810-6839 Minutes: 25    ASSESSMENT:   Jagruti Murdock is a 76 y.o. female who presents with right lower extremity, stage 2  Lymphedema secondary to a history of endometrial cancer with metastasis to the right hip resulting in a right hip replacement. Swelling is primarily present proximally from right trunk to the knee. Patient's right lower extremity limb volume is stable and she is independent with her home management program.  She was able to be provided with donated thigh high stockings today. No additional treatment visits are required at this time. Patient will continue to benefit from periodic re-evaluations and complete decongestive therapy (CDT) including manual lymphatic drainage (MLD) technique, short-stretch textile bandages/compression system to decongest limb, and kinesiotaping as appropriate. Patient will receive instruction in proper skin care to recognize signs/symptoms of and prevent infection, therapeutic exercise, and self-MLD for independent home program and restorative lymphatic performance. This care is medically necessary due to the infection risk with lymphedema and to improve functional activities. CDT is necessary to resolve swelling to allow patient to return to wearing normal clothes/footwear and prevent worsening of symptoms, such as venous stasis ulcerations, infections, or hospitalizations.   Patient will be independent with home program strategies to allow improved ADL ability and mobility and to allow patient to return to greatest functional independence. Rehabilitation potential is considered to be Good. Factors which may influence rehabilitation potential include excellent support from [facility], but limited funds for compression purchasing. Patient will benefit from a re-evaluation of her swelling status and compression product needs in 1 year. Sister Barbra Hutchinson and Sister Alyse Atkins are to call if patient has any needs prior to that follow-up visit. PLAN OF CARE:   Recommendations and Planned Interventions:  Manual lymph drainage/complete decongestive therapy  Compression garment fitting/provision  Lymphedema therapeutic exercise  Self-care training  Functional mobility training  Education in skin care and lymphedema precautions  Self-MLD education per home program  Caregiver education as needed        GOALS  Patient to continue with home lymphedema management program including daily compression garment wearing schedule, skin care, exercise, and self manual lymph drainage. Patient will return to the clinic in 1 year for re-evaluation, but they are to call if she has any needs prior to that time. Patient has participated in goal setting and agrees to work toward plan of care. Patient was instructed to call if questions or concerns arise. .    Thank you for this referral.   YODIT BettencourtT, CLT-MOLINA   Time Calculation: 85 mins    TREATMENT PLAN EFFECTIVE DATES:   1/2/2020 TO 1/2/2020  I have read the above plan of care for [facility]. I certify the above prescribed services are required by this patient and are medically necessary.   The above plan of care has been developed in conjunction with the lymphedema/physical therapist.       Physician Signature: ____________________________________Date:______________                                                Dr. Krissy Hernandez

## 2020-11-16 ENCOUNTER — HOSPITAL ENCOUNTER (INPATIENT)
Age: 75
LOS: 5 days | Discharge: HOME | DRG: 177 | End: 2020-11-21
Attending: STUDENT IN AN ORGANIZED HEALTH CARE EDUCATION/TRAINING PROGRAM | Admitting: HOSPITALIST
Payer: MEDICARE

## 2020-11-16 ENCOUNTER — APPOINTMENT (OUTPATIENT)
Dept: GENERAL RADIOLOGY | Age: 75
DRG: 177 | End: 2020-11-16
Attending: EMERGENCY MEDICINE
Payer: MEDICARE

## 2020-11-16 DIAGNOSIS — Z20.822 SUSPECTED COVID-19 VIRUS INFECTION: ICD-10-CM

## 2020-11-16 DIAGNOSIS — J12.9 VIRAL PNEUMONIA: Primary | ICD-10-CM

## 2020-11-16 LAB
25(OH)D3 SERPL-MCNC: 39.3 NG/ML (ref 30–100)
ALBUMIN SERPL-MCNC: 3.4 G/DL (ref 3.5–5)
ALBUMIN/GLOB SERPL: 0.6 {RATIO} (ref 1.1–2.2)
ALP SERPL-CCNC: 145 U/L (ref 45–117)
ALT SERPL-CCNC: 26 U/L (ref 12–78)
ANION GAP SERPL CALC-SCNC: 6 MMOL/L (ref 5–15)
AST SERPL-CCNC: 25 U/L (ref 15–37)
BASOPHILS # BLD: 0 K/UL (ref 0–0.1)
BASOPHILS NFR BLD: 0 % (ref 0–1)
BILIRUB SERPL-MCNC: 0.4 MG/DL (ref 0.2–1)
BUN SERPL-MCNC: 7 MG/DL (ref 6–20)
BUN/CREAT SERPL: 10 (ref 12–20)
CALCIUM SERPL-MCNC: 9.4 MG/DL (ref 8.5–10.1)
CHLORIDE SERPL-SCNC: 101 MMOL/L (ref 97–108)
CO2 SERPL-SCNC: 26 MMOL/L (ref 21–32)
COMMENT, HOLDF: NORMAL
CREAT SERPL-MCNC: 0.67 MG/DL (ref 0.55–1.02)
CRP SERPL-MCNC: >80 MG/DL (ref 0–0.6)
DIFFERENTIAL METHOD BLD: ABNORMAL
EOSINOPHIL # BLD: 0 K/UL (ref 0–0.4)
EOSINOPHIL NFR BLD: 0 % (ref 0–7)
ERYTHROCYTE [DISTWIDTH] IN BLOOD BY AUTOMATED COUNT: 12 % (ref 11.5–14.5)
FERRITIN SERPL-MCNC: 655 NG/ML (ref 8–252)
FIBRINOGEN PPP-MCNC: >800 MG/DL (ref 200–475)
GLOBULIN SER CALC-MCNC: 5.4 G/DL (ref 2–4)
GLUCOSE SERPL-MCNC: 115 MG/DL (ref 65–100)
HCT VFR BLD AUTO: 37.7 % (ref 35–47)
HGB BLD-MCNC: 12.5 G/DL (ref 11.5–16)
IMM GRANULOCYTES # BLD AUTO: 0.1 K/UL (ref 0–0.04)
IMM GRANULOCYTES NFR BLD AUTO: 1 % (ref 0–0.5)
LACTATE SERPL-SCNC: 0.6 MMOL/L (ref 0.4–2)
LDH SERPL L TO P-CCNC: 236 U/L (ref 81–246)
LYMPHOCYTES # BLD: 0.8 K/UL (ref 0.8–3.5)
LYMPHOCYTES NFR BLD: 14 % (ref 12–49)
MCH RBC QN AUTO: 31.4 PG (ref 26–34)
MCHC RBC AUTO-ENTMCNC: 33.2 G/DL (ref 30–36.5)
MCV RBC AUTO: 94.7 FL (ref 80–99)
MONOCYTES # BLD: 0.4 K/UL (ref 0–1)
MONOCYTES NFR BLD: 7 % (ref 5–13)
NEUTS SEG # BLD: 4.3 K/UL (ref 1.8–8)
NEUTS SEG NFR BLD: 78 % (ref 32–75)
NRBC # BLD: 0 K/UL (ref 0–0.01)
NRBC BLD-RTO: 0 PER 100 WBC
PLATELET # BLD AUTO: 173 K/UL (ref 150–400)
PMV BLD AUTO: 10 FL (ref 8.9–12.9)
POTASSIUM SERPL-SCNC: 4.1 MMOL/L (ref 3.5–5.1)
PROCALCITONIN SERPL-MCNC: <0.05 NG/ML
PROT SERPL-MCNC: 8.8 G/DL (ref 6.4–8.2)
RBC # BLD AUTO: 3.98 M/UL (ref 3.8–5.2)
RBC MORPH BLD: ABNORMAL
SAMPLES BEING HELD,HOLD: NORMAL
SODIUM SERPL-SCNC: 133 MMOL/L (ref 136–145)
TROPONIN I SERPL-MCNC: <0.05 NG/ML
WBC # BLD AUTO: 5.6 K/UL (ref 3.6–11)

## 2020-11-16 PROCEDURE — 65660000000 HC RM CCU STEPDOWN

## 2020-11-16 PROCEDURE — 86870 RBC ANTIBODY IDENTIFICATION: CPT

## 2020-11-16 PROCEDURE — 84145 PROCALCITONIN (PCT): CPT

## 2020-11-16 PROCEDURE — 86900 BLOOD TYPING SEROLOGIC ABO: CPT

## 2020-11-16 PROCEDURE — 83615 LACTATE (LD) (LDH) ENZYME: CPT

## 2020-11-16 PROCEDURE — 36415 COLL VENOUS BLD VENIPUNCTURE: CPT

## 2020-11-16 PROCEDURE — 85384 FIBRINOGEN ACTIVITY: CPT

## 2020-11-16 PROCEDURE — 86921 COMPATIBILITY TEST INCUBATE: CPT

## 2020-11-16 PROCEDURE — 80053 COMPREHEN METABOLIC PANEL: CPT

## 2020-11-16 PROCEDURE — 86140 C-REACTIVE PROTEIN: CPT

## 2020-11-16 PROCEDURE — 71046 X-RAY EXAM CHEST 2 VIEWS: CPT

## 2020-11-16 PROCEDURE — 83605 ASSAY OF LACTIC ACID: CPT

## 2020-11-16 PROCEDURE — 74011250636 HC RX REV CODE- 250/636: Performed by: HOSPITALIST

## 2020-11-16 PROCEDURE — 99283 EMERGENCY DEPT VISIT LOW MDM: CPT

## 2020-11-16 PROCEDURE — 96374 THER/PROPH/DIAG INJ IV PUSH: CPT

## 2020-11-16 PROCEDURE — 82728 ASSAY OF FERRITIN: CPT

## 2020-11-16 PROCEDURE — 85025 COMPLETE CBC W/AUTO DIFF WBC: CPT

## 2020-11-16 PROCEDURE — 74011250636 HC RX REV CODE- 250/636: Performed by: STUDENT IN AN ORGANIZED HEALTH CARE EDUCATION/TRAINING PROGRAM

## 2020-11-16 PROCEDURE — 86902 BLOOD TYPE ANTIGEN DONOR EA: CPT

## 2020-11-16 PROCEDURE — 82306 VITAMIN D 25 HYDROXY: CPT

## 2020-11-16 PROCEDURE — 86920 COMPATIBILITY TEST SPIN: CPT

## 2020-11-16 PROCEDURE — 86922 COMPATIBILITY TEST ANTIGLOB: CPT

## 2020-11-16 PROCEDURE — 87449 NOS EACH ORGANISM AG IA: CPT

## 2020-11-16 PROCEDURE — 87635 SARS-COV-2 COVID-19 AMP PRB: CPT

## 2020-11-16 PROCEDURE — 74011250637 HC RX REV CODE- 250/637: Performed by: HOSPITALIST

## 2020-11-16 PROCEDURE — 84484 ASSAY OF TROPONIN QUANT: CPT

## 2020-11-16 RX ORDER — MELATONIN
2000 DAILY
Status: DISCONTINUED | OUTPATIENT
Start: 2020-11-17 | End: 2020-11-18 | Stop reason: ALTCHOICE

## 2020-11-16 RX ORDER — ACETAMINOPHEN 650 MG/1
650 SUPPOSITORY RECTAL
Status: DISCONTINUED | OUTPATIENT
Start: 2020-11-16 | End: 2020-11-21 | Stop reason: HOSPADM

## 2020-11-16 RX ORDER — FAMOTIDINE 20 MG/1
20 TABLET, FILM COATED ORAL 2 TIMES DAILY
Status: DISCONTINUED | OUTPATIENT
Start: 2020-11-17 | End: 2020-11-21 | Stop reason: HOSPADM

## 2020-11-16 RX ORDER — ACETAMINOPHEN 325 MG/1
650 TABLET ORAL
Status: DISCONTINUED | OUTPATIENT
Start: 2020-11-16 | End: 2020-11-17 | Stop reason: SDUPTHER

## 2020-11-16 RX ORDER — POLYETHYLENE GLYCOL 3350 17 G/17G
17 POWDER, FOR SOLUTION ORAL DAILY PRN
Status: DISCONTINUED | OUTPATIENT
Start: 2020-11-16 | End: 2020-11-21 | Stop reason: HOSPADM

## 2020-11-16 RX ORDER — DEXAMETHASONE 4 MG/1
6 TABLET ORAL DAILY
Status: DISCONTINUED | OUTPATIENT
Start: 2020-11-16 | End: 2020-11-18

## 2020-11-16 RX ORDER — GUAIFENESIN/DEXTROMETHORPHAN 100-10MG/5
5 SYRUP ORAL
Status: DISCONTINUED | OUTPATIENT
Start: 2020-11-16 | End: 2020-11-21 | Stop reason: HOSPADM

## 2020-11-16 RX ORDER — ACETAMINOPHEN 325 MG/1
650 TABLET ORAL
Status: DISCONTINUED | OUTPATIENT
Start: 2020-11-16 | End: 2020-11-21 | Stop reason: HOSPADM

## 2020-11-16 RX ORDER — ONDANSETRON 2 MG/ML
4 INJECTION INTRAMUSCULAR; INTRAVENOUS
Status: COMPLETED | OUTPATIENT
Start: 2020-11-16 | End: 2020-11-16

## 2020-11-16 RX ORDER — SODIUM CHLORIDE 0.9 % (FLUSH) 0.9 %
5-40 SYRINGE (ML) INJECTION EVERY 8 HOURS
Status: DISCONTINUED | OUTPATIENT
Start: 2020-11-16 | End: 2020-11-21 | Stop reason: HOSPADM

## 2020-11-16 RX ORDER — ONDANSETRON 2 MG/ML
4 INJECTION INTRAMUSCULAR; INTRAVENOUS
Status: DISCONTINUED | OUTPATIENT
Start: 2020-11-16 | End: 2020-11-21 | Stop reason: HOSPADM

## 2020-11-16 RX ORDER — ONDANSETRON 4 MG/1
4 TABLET, ORALLY DISINTEGRATING ORAL
Status: DISCONTINUED | OUTPATIENT
Start: 2020-11-16 | End: 2020-11-21 | Stop reason: HOSPADM

## 2020-11-16 RX ORDER — ACETAMINOPHEN 650 MG/1
650 SUPPOSITORY RECTAL
Status: DISCONTINUED | OUTPATIENT
Start: 2020-11-16 | End: 2020-11-17 | Stop reason: SDUPTHER

## 2020-11-16 RX ORDER — ENOXAPARIN SODIUM 100 MG/ML
30 INJECTION SUBCUTANEOUS EVERY 12 HOURS
Status: DISCONTINUED | OUTPATIENT
Start: 2020-11-16 | End: 2020-11-21 | Stop reason: HOSPADM

## 2020-11-16 RX ORDER — SODIUM CHLORIDE 0.9 % (FLUSH) 0.9 %
5-40 SYRINGE (ML) INJECTION AS NEEDED
Status: DISCONTINUED | OUTPATIENT
Start: 2020-11-16 | End: 2020-11-21 | Stop reason: HOSPADM

## 2020-11-16 RX ADMIN — SODIUM CHLORIDE 1000 ML: 900 INJECTION, SOLUTION INTRAVENOUS at 17:59

## 2020-11-16 RX ADMIN — ACETAMINOPHEN 650 MG: 325 TABLET ORAL at 20:30

## 2020-11-16 RX ADMIN — ENOXAPARIN SODIUM 30 MG: 30 INJECTION SUBCUTANEOUS at 20:23

## 2020-11-16 RX ADMIN — DEXAMETHASONE 6 MG: 4 TABLET ORAL at 20:23

## 2020-11-16 RX ADMIN — ONDANSETRON 4 MG: 2 INJECTION INTRAMUSCULAR; INTRAVENOUS at 18:00

## 2020-11-16 NOTE — ED PROVIDER NOTES
The history is provided by the patient. Shortness of Breath   This is a new problem. The average episode lasts 1 day. The problem occurs continuously. The current episode started more than 2 days ago. The problem has been gradually worsening. Associated symptoms include a fever, headaches and cough. Pertinent negatives include no sputum production, no hemoptysis, no chest pain, no vomiting and no abdominal pain. Precipitated by: positive COVID exposure. Treatments tried: Tylenol. The treatment provided no relief. She has had no prior hospitalizations. She has had prior ED visits. Past Medical History:   Diagnosis Date    Endometrial cancer ([address]     mets to right hip and lungs       Past Surgical History:   Procedure Laterality Date    ABDOMEN SURGERY PROC UNLISTED      HX APPENDECTOMY  1975    HX HEENT  2006    OS Cataract    HX HIP REPLACEMENT Right          No family history on file.     Social History     Socioeconomic History    Marital status: SINGLE     Spouse name: Not on file    Number of children: Not on file    Years of education: Not on file    Highest education level: Not on file   Occupational History    Not on file   Social Needs    Financial resource strain: Not on file    Food insecurity     Worry: Not on file     Inability: Not on file    Transportation needs     Medical: Not on file     Non-medical: Not on file   Tobacco Use    Smoking status: Never Smoker    Smokeless tobacco: Never Used   Substance and Sexual Activity    Alcohol use: No    Drug use: Not on file    Sexual activity: Not on file   Lifestyle    Physical activity     Days per week: Not on file     Minutes per session: Not on file    Stress: Not on file   Relationships    Social connections     Talks on phone: Not on file     Gets together: Not on file     Attends Anabaptist service: Not on file     Active member of club or organization: Not on file     Attends meetings of clubs or organizations: Not on file Relationship status: Not on file    Intimate partner violence     Fear of current or ex partner: Not on file     Emotionally abused: Not on file     Physically abused: Not on file     Forced sexual activity: Not on file   Other Topics Concern    Not on file   Social History Narrative    Not on file         ALLERGIES: Aleve [naproxen sodium]    Review of Systems   Constitutional: Positive for fever. Respiratory: Positive for cough and shortness of breath. Negative for hemoptysis and sputum production. Cardiovascular: Negative for chest pain. Gastrointestinal: Negative for abdominal pain and vomiting. Genitourinary: Negative for dysuria. Musculoskeletal: Positive for myalgias. Negative for back pain. Neurological: Positive for headaches. Negative for syncope. All other systems reviewed and are negative. Vitals:    11/16/20 1639 11/16/20 1834   BP: 131/80    Pulse: 80    Resp: 16    Temp: 97 °F (36.1 °C)    SpO2: 97% 97%            Physical Exam  Vitals signs and nursing note reviewed. Constitutional:       General: She is not in acute distress. Appearance: She is well-developed. She is ill-appearing. HENT:      Head: Normocephalic and atraumatic. Eyes:      Conjunctiva/sclera: Conjunctivae normal.   Neck:      Musculoskeletal: Normal range of motion and neck supple. Cardiovascular:      Rate and Rhythm: Normal rate and regular rhythm. Pulmonary:      Effort: Pulmonary effort is normal. Tachypnea present. No respiratory distress. Abdominal:      Palpations: Abdomen is soft. Tenderness: There is no abdominal tenderness. There is no guarding. Musculoskeletal:      Right lower leg: No edema. Left lower leg: No edema. Skin:     General: Skin is warm and dry. Neurological:      Mental Status: She is alert and oriented to person, place, and time. Motor: No abnormal muscle tone.           [facility]       Procedures      Perfect Serve Consult for Admission  6:43 PM    ED Room Number: TG66/61  Patient Name and age:  Ewelina Guadarrama 76 y.o.  female  Working Diagnosis:   1. Viral pneumonia    2. 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

## 2020-11-16 NOTE — ED TRIAGE NOTES
She has been sick with body aches cough and shortness of breath for about a week. She was tested for COVID Friday and does not have results.  She was recently exposed to someone who was positive for COVID

## 2020-11-17 LAB
ANION GAP SERPL CALC-SCNC: 7 MMOL/L (ref 5–15)
BASOPHILS # BLD: 0 K/UL (ref 0–0.1)
BASOPHILS NFR BLD: 0 % (ref 0–1)
BUN SERPL-MCNC: 8 MG/DL (ref 6–20)
BUN/CREAT SERPL: 13 (ref 12–20)
CALCIUM SERPL-MCNC: 8.9 MG/DL (ref 8.5–10.1)
CHLORIDE SERPL-SCNC: 109 MMOL/L (ref 97–108)
CO2 SERPL-SCNC: 24 MMOL/L (ref 21–32)
CREAT SERPL-MCNC: 0.6 MG/DL (ref 0.55–1.02)
DIFFERENTIAL METHOD BLD: ABNORMAL
EOSINOPHIL # BLD: 0 K/UL (ref 0–0.4)
EOSINOPHIL NFR BLD: 0 % (ref 0–7)
ERYTHROCYTE [DISTWIDTH] IN BLOOD BY AUTOMATED COUNT: 12.1 % (ref 11.5–14.5)
FIBRINOGEN PPP-MCNC: 729 MG/DL (ref 200–475)
GLUCOSE SERPL-MCNC: 217 MG/DL (ref 65–100)
HCT VFR BLD AUTO: 33.5 % (ref 35–47)
HEALTH STATUS, XMCV2T: ABNORMAL
HGB BLD-MCNC: 11 G/DL (ref 11.5–16)
IMM GRANULOCYTES # BLD AUTO: 0.1 K/UL (ref 0–0.04)
IMM GRANULOCYTES NFR BLD AUTO: 2 % (ref 0–0.5)
LYMPHOCYTES # BLD: 0.6 K/UL (ref 0.8–3.5)
LYMPHOCYTES NFR BLD: 15 % (ref 12–49)
MCH RBC QN AUTO: 31 PG (ref 26–34)
MCHC RBC AUTO-ENTMCNC: 32.8 G/DL (ref 30–36.5)
MCV RBC AUTO: 94.4 FL (ref 80–99)
MONOCYTES # BLD: 0.1 K/UL (ref 0–1)
MONOCYTES NFR BLD: 3 % (ref 5–13)
NEUTS SEG # BLD: 3.1 K/UL (ref 1.8–8)
NEUTS SEG NFR BLD: 80 % (ref 32–75)
NRBC # BLD: 0 K/UL (ref 0–0.01)
NRBC BLD-RTO: 0 PER 100 WBC
PLATELET # BLD AUTO: 166 K/UL (ref 150–400)
PMV BLD AUTO: 10 FL (ref 8.9–12.9)
POTASSIUM SERPL-SCNC: 3.9 MMOL/L (ref 3.5–5.1)
RBC # BLD AUTO: 3.55 M/UL (ref 3.8–5.2)
RBC MORPH BLD: ABNORMAL
SARS-COV-2, COV2: DETECTED
SODIUM SERPL-SCNC: 140 MMOL/L (ref 136–145)
SOURCE, COVRS: ABNORMAL
SPECIMEN SOURCE, FCOV2M: ABNORMAL
SPECIMEN TYPE, XMCV1T: ABNORMAL
WBC # BLD AUTO: 3.9 K/UL (ref 3.6–11)

## 2020-11-17 PROCEDURE — 94762 N-INVAS EAR/PLS OXIMTRY CONT: CPT

## 2020-11-17 PROCEDURE — 74011250636 HC RX REV CODE- 250/636: Performed by: HOSPITALIST

## 2020-11-17 PROCEDURE — 65660000000 HC RM CCU STEPDOWN

## 2020-11-17 PROCEDURE — 80048 BASIC METABOLIC PNL TOTAL CA: CPT

## 2020-11-17 PROCEDURE — 74011250637 HC RX REV CODE- 250/637: Performed by: INTERNAL MEDICINE

## 2020-11-17 PROCEDURE — 77010033678 HC OXYGEN DAILY

## 2020-11-17 PROCEDURE — 85384 FIBRINOGEN ACTIVITY: CPT

## 2020-11-17 PROCEDURE — 74011250637 HC RX REV CODE- 250/637: Performed by: HOSPITALIST

## 2020-11-17 PROCEDURE — 85025 COMPLETE CBC W/AUTO DIFF WBC: CPT

## 2020-11-17 PROCEDURE — 36415 COLL VENOUS BLD VENIPUNCTURE: CPT

## 2020-11-17 RX ORDER — ANASTROZOLE 1 MG/1
1 TABLET ORAL DAILY
Status: DISCONTINUED | OUTPATIENT
Start: 2020-11-17 | End: 2020-11-21 | Stop reason: HOSPADM

## 2020-11-17 RX ORDER — FERROUS SULFATE, DRIED 160(50) MG
1 TABLET, EXTENDED RELEASE ORAL DAILY
COMMUNITY

## 2020-11-17 RX ORDER — MULTIVIT-MIN/FA/LYCOPEN/LUTEIN .4-300-25
1 TABLET ORAL DAILY
COMMUNITY

## 2020-11-17 RX ORDER — ANASTROZOLE 1 MG/1
1 TABLET ORAL DAILY
COMMUNITY

## 2020-11-17 RX ORDER — SODIUM CHLORIDE 9 MG/ML
100 INJECTION, SOLUTION INTRAVENOUS CONTINUOUS
Status: DISCONTINUED | OUTPATIENT
Start: 2020-11-17 | End: 2020-11-17

## 2020-11-17 RX ORDER — ZINC SULFATE 50(220)MG
1 CAPSULE ORAL DAILY
Status: DISCONTINUED | OUTPATIENT
Start: 2020-11-17 | End: 2020-11-21 | Stop reason: HOSPADM

## 2020-11-17 RX ORDER — ASCORBIC ACID 500 MG
500 TABLET ORAL 2 TIMES DAILY
Status: DISCONTINUED | OUTPATIENT
Start: 2020-11-17 | End: 2020-11-21 | Stop reason: HOSPADM

## 2020-11-17 RX ADMIN — Medication 10 ML: at 07:22

## 2020-11-17 RX ADMIN — FAMOTIDINE 20 MG: 20 TABLET, FILM COATED ORAL at 17:51

## 2020-11-17 RX ADMIN — ANASTROZOLE 1 MG: 1 TABLET, COATED ORAL at 11:01

## 2020-11-17 RX ADMIN — OXYCODONE HYDROCHLORIDE AND ACETAMINOPHEN 500 MG: 500 TABLET ORAL at 17:51

## 2020-11-17 RX ADMIN — ACETAMINOPHEN 650 MG: 325 TABLET ORAL at 02:06

## 2020-11-17 RX ADMIN — ZINC SULFATE 220 MG (50 MG) CAPSULE 1 CAPSULE: CAPSULE at 11:01

## 2020-11-17 RX ADMIN — Medication 10 ML: at 00:37

## 2020-11-17 RX ADMIN — Medication 2 TABLET: at 08:36

## 2020-11-17 RX ADMIN — DEXAMETHASONE 6 MG: 4 TABLET ORAL at 08:36

## 2020-11-17 RX ADMIN — ENOXAPARIN SODIUM 30 MG: 30 INJECTION SUBCUTANEOUS at 21:08

## 2020-11-17 RX ADMIN — AZITHROMYCIN MONOHYDRATE 500 MG: 500 INJECTION, POWDER, LYOPHILIZED, FOR SOLUTION INTRAVENOUS at 00:33

## 2020-11-17 RX ADMIN — ENOXAPARIN SODIUM 30 MG: 30 INJECTION SUBCUTANEOUS at 08:36

## 2020-11-17 RX ADMIN — SODIUM CHLORIDE 100 ML/HR: 9 INJECTION, SOLUTION INTRAVENOUS at 00:36

## 2020-11-17 RX ADMIN — Medication 10 ML: at 15:35

## 2020-11-17 RX ADMIN — FAMOTIDINE 20 MG: 20 TABLET, FILM COATED ORAL at 08:36

## 2020-11-17 RX ADMIN — Medication 10 ML: at 21:08

## 2020-11-17 NOTE — ED NOTES
Verbal shift change report given to ROHIT Yeager (oncoming nurse) by Ely Adler RN (offgoing nurse). Report included the following information SBAR, Kardex, ED Summary, [facility] and Recent Results.

## 2020-11-17 NOTE — PROGRESS NOTES
Pharmacist Admission Medication Reconciliation:    Information obtained from: This medication history was obtained from the patient by phone. RxQuery data available¹:  YES (incomplete)    Summary:     Medications added: anastrozole, calcium/cholecalciferol, multivitamin  Medications deleted: enoxaparin, tramadol   Dose changes: NA    Active and held inpatient orders were reviewed and no changes are needed. ¹RxQuery pharmacy benefit data reflects medications processed through the patient's insurance, however this data does NOT capture whether the medication is currently being taken by the patient. Allergies:  Aleve [naproxen sodium]    Significant PMH/Disease States:   Past Medical History:   Diagnosis Date    Endometrial [address] ([facility] [address]     mets to right hip and lungs     Chief Complaint for this Admission:    Chief Complaint   Patient presents with    Fever    Shortness of Breath    Generalized Body Aches     Prior to Admission Medications:   Prior to Admission Medications   Prescriptions Last Dose Informant Patient Reported? Taking?   anastrozole (Arimidex) 1 mg tablet   Yes Yes   Sig: Take 1 mg by mouth daily. calcium-vitamin D (OS-ISIDRO +D3) 500 mg-200 unit per tablet   Yes Yes   Sig: Take 1 Tab by mouth daily. multivit-min-FA-lycopen-lutein (Centrum Silver) 0.4-300-250 mg-mcg-mcg tab   Yes Yes   Sig: Take 1 Tab by mouth daily. Facility-Administered Medications: None       Thank you for allowing me to participate in this patient's care. Please call  or  with any questions. Mame Majano, Pharm. D., BCPS, BCPPS

## 2020-11-17 NOTE — PROGRESS NOTES
Bedside shift change report given to Kavon Palacios (oncoming nurse) by Franklyn Shepard (offgoing nurse). Report included the following information SBAR, Kardex, ED Summary, MAR, Recent Results and Cardiac Rhythm NSR.      Last 3 Recorded Weights in this Encounter    11/16/20 6378 11/17/20 1090   Weight: 63.1 kg (139 lb 1.8 oz) 63.1 kg (139 lb 1.8 oz)

## 2020-11-17 NOTE — PROGRESS NOTES
Hospitalist Progress Note  Jerome Collado MD  Answering service: [phone] from in house phone      Date of Service:  2020  NAME:  Annika Panda  :  1945  MRN:  [id number]    Admission Summary:   75F p/w sob/cough  Interval history / Subjective:   Patient seen and examined at bedside, feels ok, distressed with some cough. sats mid90s on RA. Assessment & Plan:     #. COVID viral PNA: not requiring supplemental O2.   - Closely monitor. Follow-up blood cultures. Continue steroids. No need for Abx now. mnitor    #. Hx of Breast Ca: continue Anastrozole. Code status: Full  DVT prophylaxis: Lovenox  Care Plan discussed with: Patient/Family and Nurse  Disposition: TBD     Hospital Problems  Date Reviewed: 2018          Codes Class Noted POA    Viral pneumonia ICD-10-CM: J12.9  ICD-9-CM: 480.9  2020 Unknown            Review of Systems:   Pertinent items are mentioned in interval history. Vital Signs:    Last 24hrs VS reviewed since prior progress note. Most recent are:  Visit Vitals  BP (!) 153/65 (BP 1 Location: Right arm, BP Patient Position: At rest)   Pulse 65   Temp 97.8 °F (36.6 °C)   Resp 28   Ht 5' 1\" (1.549 m)   Wt 63.1 kg (139 lb 1.8 oz)   SpO2 96%   BMI 26.28 kg/m²       No intake or output data in the 24 hours ending 20 1147     Physical Examination:   Evaluated face to face and examined 20    General:  Alert, oriented, mild acute distress  Card:  S1, S2 without murmur, good peripheral perfusion  Resp:  Mild accessory muscle use, Good AE, no wheezes, rhonchi b/l  Abd:  Soft, non-tender, non-distended, BS+  Extremities:  No cyanosis or clubbing, no significant edema  Neuro:  Grossly normal, no focal neuro deficits, follows commands   Psych:  Good insight, not agitated.     Data Review:    Review and/or order of clinical lab test  Review and/or order of tests in the radiology section of CPT  Review and/or order of tests in the medicine section of CPT  Labs:     Recent Labs     11/17/20 0436 11/16/20  1733   WBC 3.9 5.6   HGB 11.0* 12.5   HCT 33.5* 37.7    173     Recent Labs     11/17/20  0436 11/16/20  1733    133*   K 3.9 4.1   * 101   CO2 24 26   BUN 8 7   CREA 0.60 0.67   * 115*   CA 8.9 9.4     Recent Labs     11/16/20  1733   ALT 26   *   TBILI 0.4   TP 8.8*   ALB 3.4*   GLOB 5.4*     No results for input(s): INR, PTP, APTT, INREXT in the last 72 hours. Recent Labs     11/16/20  1733   FERR 655*      No results found for: FOL, RBCF   No results for input(s): PH, PCO2, PO2 in the last 72 hours.   Recent Labs     11/16/20  1733   TROIQ <0.05     Lab Results   Component Value Date/Time    Cholesterol, total 111 07/13/2018 03:40 AM    HDL Cholesterol 42 07/13/2018 03:40 AM    LDL, calculated 56.2 07/13/2018 03:40 AM    Triglyceride 64 07/13/2018 03:40 AM    CHOL/HDL Ratio 2.6 07/13/2018 03:40 AM     No results found for: [facility]  Lab Results   Component Value Date/Time    Color YELLOW/STRAW 07/13/2018 02:46 AM    Appearance CLEAR 07/13/2018 02:46 AM    Specific gravity 1.021 07/13/2018 02:46 AM    pH (UA) 7.0 07/13/2018 02:46 AM    Protein NEGATIVE  07/13/2018 02:46 AM    Glucose NEGATIVE  07/13/2018 02:46 AM    Ketone 15 (A) 07/13/2018 02:46 AM    Bilirubin NEGATIVE  07/13/2018 02:46 AM    Urobilinogen 1.0 07/13/2018 02:46 AM    Nitrites NEGATIVE  07/13/2018 02:46 AM    Leukocyte Esterase NEGATIVE  07/13/2018 02:46 AM    Epithelial cells FEW 07/13/2018 02:46 AM    Bacteria NEGATIVE  07/13/2018 02:46 AM    WBC 0-4 07/13/2018 02:46 AM    RBC 0-5 07/13/2018 02:46 AM     Medications Reviewed:     Current Facility-Administered Medications   Medication Dose Route Frequency    0.9% sodium chloride infusion  100 mL/hr IntraVENous CONTINUOUS    zinc sulfate (ZINCATE) 220 (50) mg capsule 1 Cap  1 Cap Oral DAILY    ascorbic acid (vitamin C) (VITAMIN C) tablet 500 mg 500 mg Oral BID    anastrozole (ARIMIDEX) tablet 1 mg  1 mg Oral DAILY    enoxaparin (LOVENOX) injection 30 mg  30 mg SubCUTAneous Q12H    guaiFENesin-dextromethorphan (ROBITUSSIN DM) 100-10 mg/5 mL syrup 5 mL  5 mL Oral Q4H PRN    dexAMETHasone (DECADRON) tablet 6 mg  6 mg Oral DAILY    cholecalciferol (VITAMIN D3) (1000 Units /25 mcg) tablet 2 Tab  2,000 Units Oral DAILY    sodium chloride (NS) flush 5-40 mL  5-40 mL IntraVENous Q8H    sodium chloride (NS) flush 5-40 mL  5-40 mL IntraVENous PRN    acetaminophen (TYLENOL) tablet 650 mg  650 mg Oral Q6H PRN    Or    acetaminophen (TYLENOL) suppository 650 mg  650 mg Rectal Q6H PRN    polyethylene glycol (MIRALAX) packet 17 g  17 g Oral DAILY PRN    ondansetron (ZOFRAN ODT) tablet 4 mg  4 mg Oral Q8H PRN    Or    ondansetron (ZOFRAN) injection 4 mg  4 mg IntraVENous Q6H PRN    famotidine (PEPCID) tablet 20 mg  20 mg Oral BID    azithromycin (ZITHROMAX) 500 mg in 0.9% sodium chloride 250 mL (VIAL-MATE)  500 mg IntraVENous Q24H   ______________________________________________________________________  EXPECTED LENGTH OF STAY: 5d 12h  ACTUAL LENGTH OF STAY:          1               Jaimie Lagos MD

## 2020-11-17 NOTE — ACP (ADVANCE CARE PLANNING)
Advance Care Planning     Advance Care Planning Activator (Inpatient)  Conversation Note      Date of ACP Conversation: 11/17/20     Conversation Conducted with:   Patient with Decision Making Capacity    ACP Activator: Stacie Soto RN    *When Decision Maker makes decisions on behalf of the incapacitated patient: Decision Maker is asked to consider and make decisions based on patient values, known preferences, or best interests. Health Care Decision Maker:    Current Designated Health Care Decision Maker:   Primary Decision Maker: Surjit Valdez - Other Relative - [phone]    Secondary Decision Maker: SisterTed - [phone]  (If there is a [address] named in the [address] Makers\" box in the ACP activity, but it is not visible above, be sure to open that field and then select the health care decision maker relationship (ie \"primary\") in the blank space to the right of the name.) Validate  this information as still accurate & up-to-date; edit [place] field as needed.)    Note: Assess and validate information in current ACP documents, as indicated. If no Decision Maker listed above or available through scanned documents, then:    If no Authorized Decision Maker has previously been identified, then patient chooses [place]:  \"Who would you like to name as your primary health care decision-maker? \"    Name:  [facility]   Relationship:  Phone number: [phone]  Gilbertorochelle otoniel this person be reached easily? \" YES  \"Who would you like to name as your back-up decision maker? \"   Jo Ann Elizabethadelaide  Relationship: friend Phone number: [phone]  Haven Cheema this person be reached easily? \" YES    Note: If the relationship of these Decision-Makers to the patient does NOT follow your state's Next of Kin hierarchy, recommend that patient complete ACP document that meets state-specific requirements to allow them to act on the patient's behalf when appropriate. Care Preferences    Ventilation: \"If you were in your present state of health and suddenly became very ill and were unable to breathe on your own, what would your preference be about the use of a ventilator (breathing machine) if it were available to you? \"      If patient would desire the use of a ventilator (breathing machine), answer \"yes\", if not \"no\":yes    \"If your health worsens and it becomes clear that your chance of recovery is unlikely, what would your preference be about the use of a ventilator (breathing machine) if it were available to you? \"     Would the patient desire the use of a ventilator (breathing machine)? YES      Resuscitation  \"CPR works best to restart the heart when there is a sudden event, like a heart attack, in someone who is otherwise healthy. Unfortunately, CPR does not typically restart the heart for people who have serious health conditions or who are very sick. \"    \"In the event your heart stopped as a result of an underlying serious health condition, would you want attempts to be made to restart your heart (answer \"yes\" for attempt to resuscitate) or would you prefer a natural death (answer \"no\" for do not attempt to resuscitate)? \" yes  [x] Yes  [] No   Educated Patient / Luther Licea regarding differences between Advance Directives and portable DNR orders.     Length of ACP Conversation in minutes:  10    Conversation Outcomes:  [x] ACP discussion completed  [] Existing advance directive reviewed with patient; no changes to patient's previously recorded wishes     [] New Advance Directive completed   [] Portable Do Not Resuscitate prepared for Provider review and signature  [] POLST/POST/MOLST/MOST prepared for Provider review and signature      Follow-up plan:    [] Schedule follow-up conversation to continue planning  [] Referred individual to Provider for additional questions/concerns   [] Advised patient/agent/surrogate to review completed ACP document and update if needed with changes in condition, patient preferences or care setting     [] This note routed to one or more involved healthcare providers

## 2020-11-17 NOTE — PROGRESS NOTES
Reason for Admission:  Admitted from home with weakness and body aches. Covid positive. Of note she is a member of [employer] and resides at [facility]. RUR Score:    10%-Low                 Plan for utilizing home health:  No needs identified at this time. Will assess as she progresses to determine level of care needed at discharge. PCP: First and Last name: Marcille Leyden    Name of Practice:    Are you a current patient: Yes/No: YES  Approximate date of last visit: 11/2020  Can you participate in a virtual visit with your PCP: YES                    Current Advanced Directive/Advance Care Plan: Does not have unable to complete at this time due to contact restriction. Transition of Care Plan:    Covid- positive   Once medically stable she will likely discharge back to [facility]. Medicare pt has received, reviewed, and signed IM letter informing them of their right to appeal the discharge. Signed copy has been placed on pt bedside chart.   Care Management Interventions  PCP Verified by CM: Yes(11/2020)  Palliative Care Criteria Met (RRAT>21 & CHF Dx)?: No  Mode of Transport at Discharge: (private car)  Current Support Network: (Lives at Mother house)  Confirm Follow Up Transport: Friends  The Patient and/or Patient Representative was Provided with a Choice of Provider and Agrees with the Discharge Plan?: Nomi Aurora [facility] )  [address] Provided?: No  Carlos Lopez  Ext [phone]

## 2020-11-17 NOTE — H&P
History and Physical                                                    Primary Care Provider: Riaz Hassan MD    NAME: Sherri Soto   :  1945   MRN:  [id number]     Date/Time:  2020 9:57 PM    Patient PCP: Riaz Hassan MD    Patient allows participation of the people present in the room to discuss their medical care. History of Presenting Illness:   Sherri Soto is a 76 y.o. female who presents with malaise fatigue and body aches for a week now. She lives in an [place] and reports that there was another member who was positive for COVID-19. She believes that she might have been in contact with this person. She reports there are other members who are also having similar symptoms. She also reports having a fever. She reports having a COVID-19 test and does not know the results yet. She then presented to the emergency room given the fevers. In ER she was noted to have a viral pneumonia on the x-ray and referred for admission. She is originally from [place] and denies history of any medical problems in the past.  She denies history of tuberculosis. She denies hematemesis or hemoptysis. In the ER she received Tylenol         Review of Systems:  A comprehensive review of systems was negative except for that written in the History of Present Illness. 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  Authorizing Provider   enoxaparin (LOVENOX) 40 mg/0.4 mL INJECT CONTENTS OF 1 SYRINGE ( 40 MG ) UNDER THE SKIN ONCE DAILY FOR 16 DAYS FOR PREVENTION OF DVT/PT POST-OPERATIVE 18   Provider, Historical   traMADol (ULTRAM) 50 mg tablet Take 1 Tab by mouth every six (6) hours as needed for Pain. Max Daily Amount: 200 mg. 6/15/18   Primus Dee, DO     Allergies   Allergen Reactions    Aleve [Naproxen Sodium] Nausea Only      No family history on file. SOCIAL HISTORY:  Patient resides:  Independently    Assisted Living    SNF    Residential Facility X      Smoking history:   None X   Former    Chronic      Alcohol history:   None X   Social    Chronic      Ambulates:   Independently X   w/cane    w/walker    w/wc    CODE STATUS:  DNR    Full X   Other      Objective:     Physical Exam:     Visit Vitals  /74   Pulse 79   Temp (!) 102.2 °F (39 °C)   Resp 24   SpO2 97%      O2 Device: Room air  Patient examined with full PPE  General:  Alert, cooperative, no distress, appears stated age. Head:  Normocephalic, without obvious abnormality, atraumatic. Eyes:  Conjunctivae/corneas clear. PERRL, EOMs intact. Nose: Nares normal. Septum midline. Mucosa normal. No drainage or sinus tenderness. Throat: Lips, mucosa, and tongue normal. Teeth and gums normal.   Neck: Supple, symmetrical, trachea midline, no adenopathy, thyroid: no enlargement/tenderness/nodules, no carotid bruit and no JVD. Back:   Symmetric, no curvature. ROM normal. No CVA tenderness. Lungs:    Decreased breath sounds at the bases   Chest wall:  No tenderness or deformity. Heart:  Regular rate and rhythm, S1, S2 normal, no murmur, click, rub or gallop. Abdomen:   Soft, non-tender. Bowel sounds normal. No masses,  No organomegaly. Extremities: Extremities normal, atraumatic, no cyanosis or edema. Pulses: 2+ and symmetric all extremities. Skin: Skin color, texture, turgor normal. No rashes or lesions   Neurologic: CNII-XII intact.      Cap refill: Brisk, less than 3 seconds  Pulses: 2+, symmetric in all extremities             Data Review:     Recent Days:  Recent Labs     11/16/20  1733   WBC 5.6   HGB 12.5   HCT 37.7        Recent Labs     11/16/20  1733   *   K 4.1    CO2 26   *   BUN 7   CREA 0.67   CA 9.4   ALB 3.4*   TBILI 0.4   ALT 26     No results for input(s): PH, PCO2, PO2, HCO3, FIO2 in the last 72 hours. 24 Hour Results:  Recent Results (from the past 24 hour(s))   CBC WITH AUTOMATED DIFF    Collection Time: 11/16/20  5:33 PM   Result Value Ref Range    WBC 5.6 3.6 - 11.0 K/uL    RBC 3.98 3.80 - 5.20 M/uL    HGB 12.5 11.5 - 16.0 g/dL    HCT 37.7 35.0 - 47.0 %    MCV 94.7 80.0 - 99.0 FL    MCH 31.4 26.0 - 34.0 PG    MCHC 33.2 30.0 - 36.5 g/dL    RDW 12.0 11.5 - 14.5 %    PLATELET 340 295 - 692 K/uL    MPV 10.0 8.9 - 12.9 FL    NRBC 0.0 0  WBC    ABSOLUTE NRBC 0.00 0.00 - 0.01 K/uL    NEUTROPHILS 78 (H) 32 - 75 %    LYMPHOCYTES 14 12 - 49 %    MONOCYTES 7 5 - 13 %    EOSINOPHILS 0 0 - 7 %    BASOPHILS 0 0 - 1 %    IMMATURE GRANULOCYTES 1 (H) 0.0 - 0.5 %    ABS. NEUTROPHILS 4.3 1.8 - 8.0 K/UL    ABS. LYMPHOCYTES 0.8 0.8 - 3.5 K/UL    ABS. MONOCYTES 0.4 0.0 - 1.0 K/UL    ABS. EOSINOPHILS 0.0 0.0 - 0.4 K/UL    ABS. BASOPHILS 0.0 0.0 - 0.1 K/UL    ABS. IMM. GRANS. 0.1 (H) 0.00 - 0.04 K/UL    DF SMEAR SCANNED      RBC COMMENTS NORMOCYTIC, NORMOCHROMIC     METABOLIC PANEL, COMPREHENSIVE    Collection Time: 11/16/20  5:33 PM   Result Value Ref Range    Sodium 133 (L) 136 - 145 mmol/L    Potassium 4.1 3.5 - 5.1 mmol/L    Chloride 101 97 - 108 mmol/L    CO2 26 21 - 32 mmol/L    Anion gap 6 5 - 15 mmol/L    Glucose 115 (H) 65 - 100 mg/dL    BUN 7 6 - 20 MG/DL    Creatinine 0.67 0.55 - 1.02 MG/DL    BUN/Creatinine ratio 10 (L) 12 - 20      GFR est AA >60 >60 ml/min/1.73m2    GFR est non-AA >60 >60 ml/min/1.73m2    Calcium 9.4 8.5 - 10.1 MG/DL    Bilirubin, total 0.4 0.2 - 1.0 MG/DL    ALT (SGPT) 26 12 - 78 U/L    AST (SGOT) 25 15 - 37 U/L    Alk.  phosphatase 145 (H) 45 - 117 U/L    Protein, total 8.8 (H) 6.4 - 8.2 g/dL    Albumin 3.4 (L) 3.5 - 5.0 g/dL    Globulin 5.4 (H) 2.0 - 4.0 g/dL    A-G Ratio 0.6 (L) 1.1 - 2.2     SAMPLES BEING HELD    Collection Time: 11/16/20  5:33 PM   Result Value Ref Range    SAMPLES BEING HELD 1 RED, 1 VIN     COMMENT        Add-on orders for these samples will be processed based on acceptable specimen integrity and analyte stability, which may vary by analyte. PROCALCITONIN    Collection Time: 11/16/20  5:33 PM   Result Value Ref Range    Procalcitonin <0.05 ng/mL   TROPONIN I    Collection Time: 11/16/20  5:33 PM   Result Value Ref Range    Troponin-I, Qt. <0.05 <0.05 ng/mL   C REACTIVE PROTEIN, QT    Collection Time: 11/16/20  5:33 PM   Result Value Ref Range    C-Reactive protein >80.00 (H) 0.00 - 0.60 mg/dL   FERRITIN    Collection Time: 11/16/20  5:33 PM   Result Value Ref Range    Ferritin 655 (H) 8 - 252 NG/ML   FIBRINOGEN    Collection Time: 11/16/20  5:33 PM   Result Value Ref Range    Fibrinogen >800 (H) 200 - 475 mg/dL   LD    Collection Time: 11/16/20  5:33 PM   Result Value Ref Range     81 - 246 U/L   VITAMIN D, 25 HYDROXY    Collection Time: 11/16/20  5:33 PM   Result Value Ref Range    Vitamin D 25-Hydroxy 39.3 30 - 100 ng/mL   SARS-COV-2    Collection Time: 11/16/20  8:40 PM   Result Value Ref Range    Specimen source Nasopharyngeal      SARS-CoV-2 PENDING     SARS-CoV-2 PENDING     Specimen source Nasopharyngeal      COVID-19 rapid test PENDING     Specimen type NP Swab      Health status Symptomatic Testing      COVID-19 PENDING    LACTIC ACID    Collection Time: 11/16/20  8:40 PM   Result Value Ref Range    Lactic acid 0.6 0.4 - 2.0 MMOL/L         Imaging:   Xr Chest Pa Lat    Result Date: 11/16/2020  IMPRESSION: 1. Bilateral patchy nodular areas of airspace disease consistent with pneumonia such as viral pneumonia. Assessment:       Given the patient's current clinical presentation, I have a high level of concern for decompensation if discharged from the emergency department.   Complex decision making was performed, which includes reviewing the patient's available past medical records, laboratory results, and x-ray films. My assessment of this patient's clinical condition and my plan of care is as follows. Active Problems:    Viral pneumonia (11/16/2020)           Plan:     Viral illness  Patient reports exposure to COVID-19  COVID-19 screening ordered by ER follow-up the same  Check Covid focused labs  Order steroids and antibiotics of Zithromax  Monitor closely pulse oximetry. Currently patient not requiring supplemental oxygen. Closely monitor. Follow-up blood cultures  Patient will need follow-up imaging in 1 month's time for resolution of radiographic findings on chest x-ray         Code Status: Full code   surrogate Decision Maker: Sister Bib Cummings    Per patient request I called the patient's head nun Sister Bib Cummings and updated plan of care    DVT Prophylaxis: Lovenox GI Prophylaxis: not indicated    FUNCTIONAL STATUS PRIOR TO ADMISSION: Ambulates Independently    Patient is from: [address] [place] [address] discussed with: Patient/Family  Anticipated Disposition: Home w/Family  Anticipated Discharge: 24 hours to 48 hours      Patient was explained about the risk associated with hospitalization including (and not a complete) risk of falls,fractures,blood clots,Bleeding from medications (including anticoagulants used for DVT ppx), Sepsis,allergic reactions,infections,radiation risk from the imaging studies performed,transfusions of blood products,Renal failure  and also risk of death. Patient also understands and agrees to the treatment plan including medications and also understand the risk with radiation while undergoing imaging studies. The patient  And  family  (after permission given by the patient) understands the need to be hospitalized and follow medical orders, agree with the admission plan. Thank You Dr Edwar Pelletier MD for taking care of your patient. Please call if you have any questions.      Signed By: Archana Gutierrez MD     November 16, 2020             Please note that this dictation was completed with Dragon, the computer voice recognition software. Quite often unanticipated grammatical, syntax, homophones, and other interpretive errors are inadvertently transcribed by the computer software. Please disregard these errors. Please excuse any errors that have escaped final proofreading. Thank you.

## 2020-11-17 NOTE — ROUTINE PROCESS
TRANSFER - OUT REPORT: 
 
Verbal report given to [address] Alvaro Nick RN(name) on Mily Muñoz  being transferred to [facility]) for routine progression of care Report consisted of patients Situation, Background, Assessment and  
Recommendations(SBAR). Information from the following report(s) SBAR, Kardex, ED Summary, [facility] and Recent Results was reviewed with the receiving nurse. Lines:  
Peripheral IV 11/16/20 Right Antecubital (Active) Site Assessment Clean, dry, & intact 11/16/20 1740 Phlebitis Assessment 0 11/16/20 1740 Infiltration Assessment 0 11/16/20 1740 Dressing Status Clean, dry, & intact 11/16/20 1740 Dressing Type Bacteriocidal 11/16/20 1740 Hub Color/Line Status Blue 11/16/20 1740 Alcohol Cap Used Yes 11/16/20 1740 Peripheral IV 11/16/20 Left Antecubital (Active) Site Assessment Clean, dry, & intact 11/16/20 2046 Phlebitis Assessment 0 11/16/20 2046 Infiltration Assessment 0 11/16/20 2046 Dressing Status Clean, dry, & intact 11/16/20 2046 Dressing Type Transparent 11/16/20 2046 Hub Color/Line Status Pink;Flushed;Patent 11/16/20 2046 Action Taken Blood drawn 11/16/20 2046 Opportunity for questions and clarification was provided. Patient transported with: 
 Social Tables

## 2020-11-17 NOTE — PROGRESS NOTES
1930: Bedside shift change report given to Alexsandra Marc RN (oncoming nurse) by Dorene Antunez RN (offgoing nurse). Report included the following information SBAR, Kardex, ED Summary, Intake/Output, MAR, Accordion, Recent Results and Cardiac Rhythm NSR.     1100: Pt COVID positive. Notified Barney Dumont MD.    Problem: Falls - Risk of  Goal: *Absence of Falls  Description: Document Victoriano Fall Risk and appropriate interventions in the flowsheet. Outcome: Progressing Towards Goal  Note: Fall Risk Interventions:    Medication Interventions: Patient to call before getting OOB, Teach patient to arise slowly    History of Falls Interventions: Room close to nurse's station, Evaluate medications/consider consulting pharmacy    Problem: Pressure Injury - Risk of  Goal: *Prevention of pressure injury  Description: Document Rafael Scale and appropriate interventions in the flowsheet. Outcome: Progressing Towards Goal  Note: Pressure Injury Interventions:    Mobility Interventions: HOB 30 degrees or less, Pressure redistribution bed/mattress (bed type)    Nutrition Interventions: Document food/fluid/supplement intake     Problem:  Activity Intolerance  Goal: *Oxygen saturation during activity within specified parameters  Outcome: Progressing Towards Goal

## 2020-11-17 NOTE — PROGRESS NOTES
Bedside shift change report given to Gisselle Jensen (oncoming nurse) by Brittaney Kelley (offgoing nurse). Report included the following information SBAR, Kardex, ED Summary, MAR, Recent Results and Cardiac Rhythm NSR. Primary Nurse May Buenrostro and Arcelia Thornton RN performed a dual skin assessment on this patient No impairment noted  Rafael score is 22    Problem: Falls - Risk of  Goal: *Absence of Falls  Description: Document Albino Messing Fall Risk and appropriate interventions in the flowsheet. Outcome: Progressing Towards Goal  Note: Fall Risk Interventions:                                Problem: Pressure Injury - Risk of  Goal: *Prevention of pressure injury  Description: Document Rafael Scale and appropriate interventions in the flowsheet. 11/17/2020 0250 by Anjelica Bueno  Outcome: Progressing Towards Goal  Note: Pressure Injury Interventions:                 Mobility Interventions: Pressure redistribution bed/mattress (bed type), HOB 30 degrees or less, PT/OT evaluation    Nutrition Interventions: Document food/fluid/supplement intake                  11/17/2020 0240 by Anjelica Bueno  Outcome: Progressing Towards Goal  Note: Pressure Injury Interventions:                                            Problem: Activity Intolerance  Goal: *Oxygen saturation during activity within specified parameters  11/17/2020 0250 by Anjelica Bueno  Outcome: Progressing Towards Goal  Note: Patient is currently on bedrest and on room air. Stating >95%. 11/17/2020 0240 by Anjelica Bueno  Outcome: Progressing Towards Goal  Note: Patient is currently on bedrest with exception to the bedside commode. Patient is on room air.

## 2020-11-18 LAB
ATRIAL RATE: 67 BPM
CALCULATED P AXIS, ECG09: 18 DEGREES
CALCULATED R AXIS, ECG10: 47 DEGREES
CALCULATED T AXIS, ECG11: 25 DEGREES
CRP SERPL-MCNC: 7.77 MG/DL (ref 0–0.6)
D DIMER PPP FEU-MCNC: 0.55 MG/L FEU (ref 0–0.65)
DIAGNOSIS, 93000: NORMAL
FERRITIN SERPL-MCNC: 705 NG/ML (ref 8–252)
FIBRINOGEN PPP-MCNC: 606 MG/DL (ref 200–475)
LDH SERPL L TO P-CCNC: 227 U/L (ref 81–246)
P-R INTERVAL, ECG05: 144 MS
Q-T INTERVAL, ECG07: 394 MS
QRS DURATION, ECG06: 86 MS
QTC CALCULATION (BEZET), ECG08: 416 MS
VENTRICULAR RATE, ECG03: 67 BPM

## 2020-11-18 PROCEDURE — 74011250636 HC RX REV CODE- 250/636: Performed by: HOSPITALIST

## 2020-11-18 PROCEDURE — 93005 ELECTROCARDIOGRAM TRACING: CPT

## 2020-11-18 PROCEDURE — 86140 C-REACTIVE PROTEIN: CPT

## 2020-11-18 PROCEDURE — 82728 ASSAY OF FERRITIN: CPT

## 2020-11-18 PROCEDURE — 65270000032 HC RM SEMIPRIVATE

## 2020-11-18 PROCEDURE — 83615 LACTATE (LD) (LDH) ENZYME: CPT

## 2020-11-18 PROCEDURE — 85379 FIBRIN DEGRADATION QUANT: CPT

## 2020-11-18 PROCEDURE — 74011250637 HC RX REV CODE- 250/637: Performed by: INTERNAL MEDICINE

## 2020-11-18 PROCEDURE — 85384 FIBRINOGEN ACTIVITY: CPT

## 2020-11-18 PROCEDURE — 74011250637 HC RX REV CODE- 250/637: Performed by: HOSPITALIST

## 2020-11-18 PROCEDURE — 74011250636 HC RX REV CODE- 250/636: Performed by: NURSE PRACTITIONER

## 2020-11-18 PROCEDURE — 36415 COLL VENOUS BLD VENIPUNCTURE: CPT

## 2020-11-18 RX ADMIN — Medication 2 TABLET: at 08:49

## 2020-11-18 RX ADMIN — ENOXAPARIN SODIUM 30 MG: 30 INJECTION SUBCUTANEOUS at 20:29

## 2020-11-18 RX ADMIN — Medication 10 ML: at 06:40

## 2020-11-18 RX ADMIN — OXYCODONE HYDROCHLORIDE AND ACETAMINOPHEN 500 MG: 500 TABLET ORAL at 18:04

## 2020-11-18 RX ADMIN — ANASTROZOLE 1 MG: 1 TABLET, COATED ORAL at 08:48

## 2020-11-18 RX ADMIN — ACETAMINOPHEN 650 MG: 325 TABLET ORAL at 07:39

## 2020-11-18 RX ADMIN — ENOXAPARIN SODIUM 30 MG: 30 INJECTION SUBCUTANEOUS at 07:35

## 2020-11-18 RX ADMIN — ZINC SULFATE 220 MG (50 MG) CAPSULE 1 CAPSULE: CAPSULE at 08:49

## 2020-11-18 RX ADMIN — SODIUM CHLORIDE 1000 ML: 9 INJECTION, SOLUTION INTRAVENOUS at 06:59

## 2020-11-18 RX ADMIN — OXYCODONE HYDROCHLORIDE AND ACETAMINOPHEN 500 MG: 500 TABLET ORAL at 08:49

## 2020-11-18 RX ADMIN — FAMOTIDINE 20 MG: 20 TABLET, FILM COATED ORAL at 08:49

## 2020-11-18 RX ADMIN — FAMOTIDINE 20 MG: 20 TABLET, FILM COATED ORAL at 18:04

## 2020-11-18 RX ADMIN — Medication 10 ML: at 14:00

## 2020-11-18 RX ADMIN — DEXAMETHASONE 6 MG: 4 TABLET ORAL at 08:49

## 2020-11-18 NOTE — ROUTINE PROCESS
TRANSFER - IN REPORT: 
 
Verbal report received from Rosy Mazariegos RN(name) on Giovanny Plsharri  being received from [facility]) for routine progression of care Report consisted of patients Situation, Background, Assessment and  
Recommendations(SBAR). Information from the following report(s) SBAR and Kardex was reviewed with the receiving nurse. Opportunity for questions and clarification was provided. Assessment completed upon patients arrival to unit and care assumed.

## 2020-11-18 NOTE — PROGRESS NOTES
Hospitalist Progress Note  Chad Jean MD  Answering service: [phone] from in house phone      Date of Service:  2020  NAME:  Ursula Noriega  :  1945  MRN:  [id number]    Admission Summary:   75F p/w sob/cough  Interval history / Subjective:   Patient seen and examined at bedside, feels ok now, was distressed with cough and headache earlier. Assessment & Plan:     #. COVID viral PNA: not requiring supplemental O2.   -  steroids. No need for Abx now. mnitor closely, if continues to be off O2/ feels well. [facility]. #. Hx of Breast Ca: continue Anastrozole. Code status: Full  DVT prophylaxis: Lovenox  Care Plan discussed with: Patient/Family and Nurse  Disposition: TBD 1-2 days     Hospital Problems  Date Reviewed: 2018          Codes Class Noted POA    Viral pneumonia ICD-10-CM: J12.9  ICD-9-CM: 480.9  2020 Unknown            Review of Systems:   Pertinent items are mentioned in interval history. Vital Signs:    Last 24hrs VS reviewed since prior progress note. Most recent are:  Visit Vitals  /65 (BP 1 Location: Right arm, BP Patient Position: At rest)   Pulse 66   Temp 98.2 °F (36.8 °C)   Resp 27   Ht 5' 1\" (1.549 m)   Wt 64.4 kg (141 lb 15.6 oz)   SpO2 98%   BMI 26.83 kg/m²       No intake or output data in the 24 hours ending 20 1140     Physical Examination:   Evaluated face to face and examined 20    General:  Alert, oriented, mild acute distress, pleasant. Resp:  Mild accessory muscle use, Good AE, no wheezes, few rhonchi b/l  Abd:  Soft, non-tender, non-distended  Extremities:  No cyanosis or clubbing, no significant edema  Neuro:  Grossly normal, no focal neuro deficits, follows commands   Psych:  Good insight, not agitated.     Data Review:    Review and/or order of clinical lab test  Review and/or order of tests in the radiology section of C[facility]  Review and/or order of tests in the medicine section of [facility]  Labs:     Recent Labs     11/17/20  0436 11/16/20  1733   WBC 3.9 5.6   HGB 11.0* 12.5   HCT 33.5* 37.7    173     Recent Labs     11/17/20  0436 11/16/20  1733    133*   K 3.9 4.1   * 101   CO2 24 26   BUN 8 7   CREA 0.60 0.67   * 115*   CA 8.9 9.4     Recent Labs     11/16/20  1733   ALT 26   *   TBILI 0.4   TP 8.8*   ALB 3.4*   GLOB 5.4*     No results for input(s): INR, PTP, APTT, INREXT, INREXT in the last 72 hours. Recent Labs     11/18/20  0403 11/16/20  1733   FERR 705* 655*      No results found for: FOL, RBCF   No results for input(s): PH, PCO2, PO2 in the last 72 hours.   Recent Labs     11/16/20  1733   TROIQ <0.05     Lab Results   Component Value Date/Time    Cholesterol, total 111 07/13/2018 03:40 AM    HDL Cholesterol 42 07/13/2018 03:40 AM    LDL, calculated 56.2 07/13/2018 03:40 AM    Triglyceride 64 07/13/2018 03:40 AM    CHOL/HDL Ratio 2.6 07/13/2018 03:40 AM     No results found for: Rober Fagan  Lab Results   Component Value Date/Time    Color YELLOW/STRAW 07/13/2018 02:46 AM    Appearance CLEAR 07/13/2018 02:46 AM    Specific gravity 1.021 07/13/2018 02:46 AM    pH (UA) 7.0 07/13/2018 02:46 AM    Protein NEGATIVE  07/13/2018 02:46 AM    Glucose NEGATIVE  07/13/2018 02:46 AM    Ketone 15 (A) 07/13/2018 02:46 AM    Bilirubin NEGATIVE  07/13/2018 02:46 AM    Urobilinogen 1.0 07/13/2018 02:46 AM    Nitrites NEGATIVE  07/13/2018 02:46 AM    Leukocyte Esterase NEGATIVE  07/13/2018 02:46 AM    Epithelial cells FEW 07/13/2018 02:46 AM    Bacteria NEGATIVE  07/13/2018 02:46 AM    WBC 0-4 07/13/2018 02:46 AM    RBC 0-5 07/13/2018 02:46 AM     Medications Reviewed:     Current Facility-Administered Medications   Medication Dose Route Frequency    zinc sulfate (ZINCATE) 220 (50) mg capsule 1 Cap  1 Cap Oral DAILY    ascorbic acid (vitamin C) (VITAMIN C) tablet 500 mg  500 mg Oral BID    anastrozole (ARIMIDEX) tablet 1 mg 1 mg Oral DAILY    enoxaparin (LOVENOX) injection 30 mg  30 mg SubCUTAneous Q12H    guaiFENesin-dextromethorphan (ROBITUSSIN DM) 100-10 mg/5 mL syrup 5 mL  5 mL Oral Q4H PRN    dexAMETHasone (DECADRON) tablet 6 mg  6 mg Oral DAILY    cholecalciferol (VITAMIN D3) (1000 Units /25 mcg) tablet 2 Tab  2,000 Units Oral DAILY    sodium chloride (NS) flush 5-40 mL  5-40 mL IntraVENous Q8H    sodium chloride (NS) flush 5-40 mL  5-40 mL IntraVENous PRN    acetaminophen (TYLENOL) tablet 650 mg  650 mg Oral Q6H PRN    Or    acetaminophen (TYLENOL) suppository 650 mg  650 mg Rectal Q6H PRN    polyethylene glycol (MIRALAX) packet 17 g  17 g Oral DAILY PRN    ondansetron (ZOFRAN ODT) tablet 4 mg  4 mg Oral Q8H PRN    Or    ondansetron (ZOFRAN) injection 4 mg  4 mg IntraVENous Q6H PRN    famotidine (PEPCID) tablet 20 mg  20 mg Oral BID   ______________________________________________________________________  EXPECTED LENGTH OF STAY: 5d 12h  ACTUAL LENGTH OF STAY:          2               Flo Webb MD

## 2020-11-18 NOTE — PROGRESS NOTES
[address] [facility] visit. Mrs. Jones is unable to receive communion due to medical restrictions. She is [employer] for spiritual communion offered outside her room.     GONZALO Dacosta, RN, ACSW, LCSW   Page:  [phone])

## 2020-11-18 NOTE — PROGRESS NOTES
Notified by primary nurse to the onset tachycardia. Patient reportedly asymptomatic.     We will check EKG, monitor vital signs,  NS bolus 1 L      Celestine Hurtado, MPH, MSN, RN, NP-C  [phone] or via [address] [place] [address]

## 2020-11-18 NOTE — PROGRESS NOTES
Bedside shift change report given to Adele Alfaro RN (oncoming nurse) by Enio Ruiz RN (offgoing nurse). Report included the following information SBAR, Kardex, ED Summary, Recent Results and Cardiac Rhythm NSR. Problem: Falls - Risk of  Goal: *Absence of Falls  Description: Document Claire Flynn Fall Risk and appropriate interventions in the flowsheet. Outcome: Progressing Towards Goal  Note: Fall Risk Interventions:            Medication Interventions: Evaluate medications/consider consulting pharmacy, Patient to call before getting OOB, Teach patient to arise slowly         History of Falls Interventions: Evaluate medications/consider consulting pharmacy, Room close to nurse's station, Utilize gait belt for transfer/ambulation         Problem: Patient Education: Go to Patient Education Activity  Goal: Patient/Family Education  Outcome: Progressing Towards Goal     Problem:  Activity Intolerance  Goal: *Oxygen saturation during activity within specified parameters  Outcome: Progressing Towards Goal  Goal: *Able to remain out of bed as prescribed  Outcome: Progressing Towards Goal

## 2020-11-18 NOTE — PROGRESS NOTES
approx 0635: Pt awakened by RN due to 's. Pt denied chest pain, lightheadedness, or dizziness. No numbness or tingling to extremities. No c/o nausea. Pt placed on 2L oxygen, on call MD notified after sustaining approx 5 minutes. Vagal maneuvers attempted but not successful. New orders received.     0701: HR back to 70's, NSR. RN hanging 1L bolus as ordered. EKG to be completed. Will continue to monitor.

## 2020-11-18 NOTE — PROGRESS NOTES
1648: TRANSFER - OUT REPORT:    Verbal report given to Jenny Wesley RN(name) on Ewelina Guadarrama  being transferred to (unit) for routine progression of care       Report consisted of patients Situation, Background, Assessment and   Recommendations(SBAR). Information from the following report(s) SBAR, Kardex, ED Summary, Intake/Output, MAR, Accordion, Recent Results and Cardiac Rhythm NSR/SB was reviewed with the receiving nurse. Opportunity for questions and clarification was provided. Problem: Falls - Risk of  Goal: *Absence of Falls  Description: Document Bell Meneses Fall Risk and appropriate interventions in the flowsheet. Outcome: Progressing Towards Goal  Note: Fall Risk Interventions:     Medication Interventions: Evaluate medications/consider consulting pharmacy, Patient to call before getting OOB     History of Falls Interventions: Evaluate medications/consider consulting pharmacy, Room close to nurse's station     Problem: Pressure Injury - Risk of  Goal: *Prevention of pressure injury  Description: Document Rafael Scale and appropriate interventions in the flowsheet. Outcome: Progressing Towards Goal  Note: Pressure Injury Interventions:    Mobility Interventions: HOB 30 degrees or less    Nutrition Interventions: Document food/fluid/supplement intake     Problem:  Activity Intolerance  Goal: *Oxygen saturation during activity within specified parameters  Outcome: Progressing Towards Goal

## 2020-11-19 LAB
ALBUMIN SERPL-MCNC: 2.7 G/DL (ref 3.5–5)
ALBUMIN/GLOB SERPL: 0.6 {RATIO} (ref 1.1–2.2)
ALP SERPL-CCNC: 92 U/L (ref 45–117)
ALT SERPL-CCNC: 28 U/L (ref 12–78)
ANION GAP SERPL CALC-SCNC: 10 MMOL/L (ref 5–15)
AST SERPL-CCNC: 28 U/L (ref 15–37)
BILIRUB SERPL-MCNC: 0.6 MG/DL (ref 0.2–1)
BUN SERPL-MCNC: 17 MG/DL (ref 6–20)
BUN/CREAT SERPL: 22 (ref 12–20)
CALCIUM SERPL-MCNC: 8.6 MG/DL (ref 8.5–10.1)
CHLORIDE SERPL-SCNC: 102 MMOL/L (ref 97–108)
CO2 SERPL-SCNC: 22 MMOL/L (ref 21–32)
CREAT SERPL-MCNC: 0.79 MG/DL (ref 0.55–1.02)
D DIMER PPP FEU-MCNC: 1.42 MG/L FEU (ref 0–0.65)
ERYTHROCYTE [DISTWIDTH] IN BLOOD BY AUTOMATED COUNT: 12.1 % (ref 11.5–14.5)
EST. AVERAGE GLUCOSE BLD GHB EST-MCNC: 134 MG/DL
FIBRINOGEN PPP-MCNC: 627 MG/DL (ref 200–475)
GLOBULIN SER CALC-MCNC: 4.4 G/DL (ref 2–4)
GLUCOSE BLD STRIP.AUTO-MCNC: 132 MG/DL (ref 65–100)
GLUCOSE BLD STRIP.AUTO-MCNC: 183 MG/DL (ref 65–100)
GLUCOSE BLD STRIP.AUTO-MCNC: 184 MG/DL (ref 65–100)
GLUCOSE SERPL-MCNC: 164 MG/DL (ref 65–100)
HBA1C MFR BLD: 6.3 % (ref 4–5.6)
HCT VFR BLD AUTO: 31.7 % (ref 35–47)
HGB BLD-MCNC: 10.8 G/DL (ref 11.5–16)
L PNEUMO1 AG UR QL IA: NEGATIVE
MCH RBC QN AUTO: 31.2 PG (ref 26–34)
MCHC RBC AUTO-ENTMCNC: 34.1 G/DL (ref 30–36.5)
MCV RBC AUTO: 91.6 FL (ref 80–99)
NRBC # BLD: 0 K/UL (ref 0–0.01)
NRBC BLD-RTO: 0 PER 100 WBC
PLATELET # BLD AUTO: 197 K/UL (ref 150–400)
PMV BLD AUTO: 10.1 FL (ref 8.9–12.9)
POTASSIUM SERPL-SCNC: 3.2 MMOL/L (ref 3.5–5.1)
PROT SERPL-MCNC: 7.1 G/DL (ref 6.4–8.2)
RBC # BLD AUTO: 3.46 M/UL (ref 3.8–5.2)
SERVICE CMNT-IMP: ABNORMAL
SODIUM SERPL-SCNC: 134 MMOL/L (ref 136–145)
SPECIMEN SOURCE: NORMAL
WBC # BLD AUTO: 13.6 K/UL (ref 3.6–11)

## 2020-11-19 PROCEDURE — 85379 FIBRIN DEGRADATION QUANT: CPT

## 2020-11-19 PROCEDURE — 74011250637 HC RX REV CODE- 250/637: Performed by: INTERNAL MEDICINE

## 2020-11-19 PROCEDURE — 74011250637 HC RX REV CODE- 250/637: Performed by: HOSPITALIST

## 2020-11-19 PROCEDURE — 94640 AIRWAY INHALATION TREATMENT: CPT

## 2020-11-19 PROCEDURE — 82962 GLUCOSE BLOOD TEST: CPT

## 2020-11-19 PROCEDURE — 80053 COMPREHEN METABOLIC PANEL: CPT

## 2020-11-19 PROCEDURE — 74011250636 HC RX REV CODE- 250/636: Performed by: INTERNAL MEDICINE

## 2020-11-19 PROCEDURE — 83036 HEMOGLOBIN GLYCOSYLATED A1C: CPT

## 2020-11-19 PROCEDURE — 74011636637 HC RX REV CODE- 636/637: Performed by: INTERNAL MEDICINE

## 2020-11-19 PROCEDURE — 85027 COMPLETE CBC AUTOMATED: CPT

## 2020-11-19 PROCEDURE — 65270000032 HC RM SEMIPRIVATE

## 2020-11-19 PROCEDURE — 85384 FIBRINOGEN ACTIVITY: CPT

## 2020-11-19 PROCEDURE — 74011250636 HC RX REV CODE- 250/636: Performed by: HOSPITALIST

## 2020-11-19 PROCEDURE — 36415 COLL VENOUS BLD VENIPUNCTURE: CPT

## 2020-11-19 PROCEDURE — 94664 DEMO&/EVAL PT USE INHALER: CPT

## 2020-11-19 RX ORDER — INSULIN LISPRO 100 [IU]/ML
INJECTION, SOLUTION INTRAVENOUS; SUBCUTANEOUS
Status: DISCONTINUED | OUTPATIENT
Start: 2020-11-19 | End: 2020-11-21 | Stop reason: HOSPADM

## 2020-11-19 RX ORDER — DEXTROSE MONOHYDRATE 100 MG/ML
0-250 INJECTION, SOLUTION INTRAVENOUS AS NEEDED
Status: DISCONTINUED | OUTPATIENT
Start: 2020-11-19 | End: 2020-11-21 | Stop reason: HOSPADM

## 2020-11-19 RX ORDER — MAGNESIUM SULFATE 100 %
4 CRYSTALS MISCELLANEOUS AS NEEDED
Status: DISCONTINUED | OUTPATIENT
Start: 2020-11-19 | End: 2020-11-21 | Stop reason: HOSPADM

## 2020-11-19 RX ORDER — ALBUTEROL SULFATE 90 UG/1
2 AEROSOL, METERED RESPIRATORY (INHALATION)
Status: DISCONTINUED | OUTPATIENT
Start: 2020-11-19 | End: 2020-11-21 | Stop reason: HOSPADM

## 2020-11-19 RX ORDER — DEXAMETHASONE 4 MG/1
6 TABLET ORAL DAILY
Status: DISCONTINUED | OUTPATIENT
Start: 2020-11-19 | End: 2020-11-21 | Stop reason: HOSPADM

## 2020-11-19 RX ORDER — GUAIFENESIN 600 MG/1
600 TABLET, EXTENDED RELEASE ORAL EVERY 12 HOURS
Status: DISCONTINUED | OUTPATIENT
Start: 2020-11-19 | End: 2020-11-21 | Stop reason: HOSPADM

## 2020-11-19 RX ADMIN — ENOXAPARIN SODIUM 30 MG: 30 INJECTION SUBCUTANEOUS at 19:59

## 2020-11-19 RX ADMIN — FAMOTIDINE 20 MG: 20 TABLET, FILM COATED ORAL at 17:36

## 2020-11-19 RX ADMIN — GUAIFENESIN 600 MG: 600 TABLET, EXTENDED RELEASE ORAL at 13:12

## 2020-11-19 RX ADMIN — Medication 10 ML: at 13:12

## 2020-11-19 RX ADMIN — OXYCODONE HYDROCHLORIDE AND ACETAMINOPHEN 500 MG: 500 TABLET ORAL at 09:05

## 2020-11-19 RX ADMIN — ALBUTEROL SULFATE 2 PUFF: 90 AEROSOL, METERED RESPIRATORY (INHALATION) at 13:47

## 2020-11-19 RX ADMIN — ZINC SULFATE 220 MG (50 MG) CAPSULE 1 CAPSULE: CAPSULE at 09:05

## 2020-11-19 RX ADMIN — GUAIFENESIN 600 MG: 600 TABLET, EXTENDED RELEASE ORAL at 21:44

## 2020-11-19 RX ADMIN — ENOXAPARIN SODIUM 30 MG: 30 INJECTION SUBCUTANEOUS at 06:46

## 2020-11-19 RX ADMIN — ACETAMINOPHEN 650 MG: 325 TABLET ORAL at 06:54

## 2020-11-19 RX ADMIN — Medication 10 ML: at 06:46

## 2020-11-19 RX ADMIN — INSULIN LISPRO 2 UNITS: 100 INJECTION, SOLUTION INTRAVENOUS; SUBCUTANEOUS at 16:30

## 2020-11-19 RX ADMIN — DEXAMETHASONE 6 MG: 4 TABLET ORAL at 10:34

## 2020-11-19 RX ADMIN — ANASTROZOLE 1 MG: 1 TABLET, COATED ORAL at 09:05

## 2020-11-19 RX ADMIN — OXYCODONE HYDROCHLORIDE AND ACETAMINOPHEN 500 MG: 500 TABLET ORAL at 17:36

## 2020-11-19 RX ADMIN — FAMOTIDINE 20 MG: 20 TABLET, FILM COATED ORAL at 09:05

## 2020-11-19 RX ADMIN — Medication 5 ML: at 21:45

## 2020-11-19 NOTE — PROGRESS NOTES
Problem: Falls - Risk of  Goal: *Absence of Falls  Description: Document Lemon Juan Fall Risk and appropriate interventions in the flowsheet.   Outcome: Progressing Towards Goal  Note: Fall Risk Interventions:            Medication Interventions: Patient to call before getting OOB         History of Falls Interventions: Evaluate medications/consider consulting pharmacy, Room close to nurse's station

## 2020-11-19 NOTE — PROGRESS NOTES
Bedside and Verbal shift change report given to James Henry RN (oncoming nurse) by Cathy Michelle RN (offgoing nurse). Report included the following information SBAR, Kardex, MAR and Recent Results.

## 2020-11-19 NOTE — PROGRESS NOTES
Hospitalist Progress Note  DO Miranda Armendariz service: [phone] from in house phone      Date of Service:  2020  NAME:  Gifty Patricio  :  1945  MRN:  [id number]    Admission Summary:   75F p/w sob/cough  Interval history / Subjective: Follow up COVID 19. Patient seen and examined. First encounter. Hypoxic on room air today. Has cough. Ananya Fent to go home. Assessment & Plan:     COVID viral PNA:   Acute hypoxic respiratory failure:   -Continue dexamethasone, vit c, zinc, lovenox per pharmacy protocol  -continue to monitor off antibiotics  -Repeat CXR due to persistent hypoxia, consider lasix   -add albuterol, mucinex, incentive spirometer     Hx of Breast Ca: continue Anastrozole    Hyperglycemia:   -Ha1c 6.3, consistent with prediabetes  -initiate SSI    Code status: Full  DVT prophylaxis: Lovenox  Care Plan discussed with: Patient/Family and Nurse  Disposition: TBD 1-2 days     Hospital Problems  Date Reviewed: 2018          Codes Class Noted POA    Viral pneumonia ICD-10-CM: J12.9  ICD-9-CM: 480.9  2020 Unknown            Review of Systems:   Pertinent items are mentioned in interval history. Vital Signs:    Last 24hrs VS reviewed since prior progress note. Most recent are:  Visit Vitals  BP (!) 91/53 (BP 1 Location: Right arm, BP Patient Position: Sitting)   Pulse (!) 53   Temp 98.4 °F (36.9 °C)   Resp 18   Ht 5' 1\" (1.549 m)   Wt 64.4 kg (141 lb 15.6 oz)   SpO2 96%   BMI 26.83 kg/m²       No intake or output data in the 24 hours ending 20 1202     Physical Examination:   Evaluated face to face and examined 20    General:  Alert, oriented, no acute distress, pleasant.   Resp: no  accessory muscle use, Good AE, no wheezes, bilateral crackles  Abd:  Soft, non-tender, non-distended  Extremities:  No cyanosis or clubbing, no significant edema  Neuro:  Grossly normal, no focal neuro deficits, follows commands   Psych:  Good insight, not agitated. Data Review:    Review and/or order of clinical lab test  Review and/or order of tests in the radiology section of CPT  Review and/or order of tests in the medicine section of [facility]  Labs:     Recent Labs     11/19/20  1037 11/17/20  0436   WBC 13.6* 3.9   HGB 10.8* 11.0*   HCT 31.7* 33.5*    166     Recent Labs     11/17/20  0436 11/16/20  1733    133*   K 3.9 4.1   * 101   CO2 24 26   BUN 8 7   CREA 0.60 0.67   * 115*   CA 8.9 9.4     Recent Labs     11/16/20  1733   ALT 26   *   TBILI 0.4   TP 8.8*   ALB 3.4*   GLOB 5.4*     No results for input(s): INR, PTP, APTT, INREXT, INREXT in the last 72 hours. Recent Labs     11/18/20  0403 11/16/20  1733   FERR 705* 655*      No results found for: FOL, RBCF   No results for input(s): PH, PCO2, PO2 in the last 72 hours.   Recent Labs     11/16/20  1733   TROIQ <0.05     Lab Results   Component Value Date/Time    Cholesterol, total 111 07/13/2018 03:40 AM    HDL Cholesterol 42 07/13/2018 03:40 AM    LDL, calculated 56.2 07/13/2018 03:40 AM    Triglyceride 64 07/13/2018 03:40 AM    CHOL/HDL Ratio 2.6 07/13/2018 03:40 AM     Lab Results   Component Value Date/Time    Glucose (POC) 132 (H) 11/19/2020 11:43 AM     Lab Results   Component Value Date/Time    Color YELLOW/STRAW 07/13/2018 02:46 AM    Appearance CLEAR 07/13/2018 02:46 AM    Specific gravity 1.021 07/13/2018 02:46 AM    pH (UA) 7.0 07/13/2018 02:46 AM    Protein NEGATIVE  07/13/2018 02:46 AM    Glucose NEGATIVE  07/13/2018 02:46 AM    Ketone 15 (A) 07/13/2018 02:46 AM    Bilirubin NEGATIVE  07/13/2018 02:46 AM    Urobilinogen 1.0 07/13/2018 02:46 AM    Nitrites NEGATIVE  07/13/2018 02:46 AM    Leukocyte Esterase NEGATIVE  07/13/2018 02:46 AM    Epithelial cells FEW 07/13/2018 02:46 AM    Bacteria NEGATIVE  07/13/2018 02:46 AM    WBC 0-4 07/13/2018 02:46 AM    RBC 0-5 07/13/2018 02:46 AM     Medications Reviewed:     Current Facility-Administered Medications   Medication Dose Route Frequency    dexAMETHasone (DECADRON) tablet 6 mg  6 mg Oral DAILY    glucose chewable tablet 16 g  4 Tab Oral PRN    glucagon (GLUCAGEN) injection 1 mg  1 mg IntraMUSCular PRN    dextrose 10% infusion 0-250 mL  0-250 mL IntraVENous PRN    insulin lispro (HUMALOG) injection   SubCUTAneous AC&HS    zinc sulfate (ZINCATE) 220 (50) mg capsule 1 Cap  1 Cap Oral DAILY    ascorbic acid (vitamin C) (VITAMIN C) tablet 500 mg  500 mg Oral BID    anastrozole (ARIMIDEX) tablet 1 mg  1 mg Oral DAILY    enoxaparin (LOVENOX) injection 30 mg  30 mg SubCUTAneous Q12H    guaiFENesin-dextromethorphan (ROBITUSSIN DM) 100-10 mg/5 mL syrup 5 mL  5 mL Oral Q4H PRN    sodium chloride (NS) flush 5-40 mL  5-40 mL IntraVENous Q8H    sodium chloride (NS) flush 5-40 mL  5-40 mL IntraVENous PRN    acetaminophen (TYLENOL) tablet 650 mg  650 mg Oral Q6H PRN    Or    acetaminophen (TYLENOL) suppository 650 mg  650 mg Rectal Q6H PRN    polyethylene glycol (MIRALAX) packet 17 g  17 g Oral DAILY PRN    ondansetron (ZOFRAN ODT) tablet 4 mg  4 mg Oral Q8H PRN    Or    ondansetron (ZOFRAN) injection 4 mg  4 mg IntraVENous Q6H PRN    famotidine (PEPCID) tablet 20 mg  20 mg Oral BID   ______________________________________________________________________  EXPECTED LENGTH OF STAY: 5d 12h  ACTUAL LENGTH OF STAY:          DO Jah

## 2020-11-19 NOTE — PROGRESS NOTES
Problem: Falls - Risk of  Goal: *Absence of Falls  Description: Document Albino Messing Fall Risk and appropriate interventions in the flowsheet.   Outcome: Progressing Towards Goal  Note: Fall Risk Interventions:            Medication Interventions: Patient to call before getting OOB         History of Falls Interventions: Evaluate medications/consider consulting pharmacy, Room close to nurse's station

## 2020-11-19 NOTE — PROGRESS NOTES
CIERRA:  1. RUR-13%  2. Currently on 2L [place] oxygen. 3. Return to [facility]           CM spoke with Caitlin Huitron this morning from [facility], she confirmed they are able to accept this patient back once she is on room air. MD aware.       Rand Mortimer, [facility]

## 2020-11-20 LAB
ABO + RH BLD: NORMAL
ANTIGENS PRESENT RBC DONR: NORMAL
BLD PROD TYP BPU: NORMAL
BLOOD GROUP ANTIBODIES SERPL: NORMAL
BLOOD GROUP ANTIBODIES SERPL: NORMAL
BPU ID: NORMAL
CROSSMATCH RESULT,%XM: NORMAL
D DIMER PPP FEU-MCNC: 0.72 MG/L FEU (ref 0–0.65)
FIBRINOGEN PPP-MCNC: 675 MG/DL (ref 200–475)
GLUCOSE BLD STRIP.AUTO-MCNC: 124 MG/DL (ref 65–100)
GLUCOSE BLD STRIP.AUTO-MCNC: 147 MG/DL (ref 65–100)
GLUCOSE BLD STRIP.AUTO-MCNC: 183 MG/DL (ref 65–100)
GLUCOSE BLD STRIP.AUTO-MCNC: 195 MG/DL (ref 65–100)
SERVICE CMNT-IMP: ABNORMAL
SPECIMEN EXP DATE BLD: NORMAL
STATUS OF UNIT,%ST: NORMAL
UNIT DIVISION, %UDIV: 0

## 2020-11-20 PROCEDURE — 74011250637 HC RX REV CODE- 250/637: Performed by: HOSPITALIST

## 2020-11-20 PROCEDURE — 82962 GLUCOSE BLOOD TEST: CPT

## 2020-11-20 PROCEDURE — 74011250636 HC RX REV CODE- 250/636: Performed by: INTERNAL MEDICINE

## 2020-11-20 PROCEDURE — 94760 N-INVAS EAR/PLS OXIMETRY 1: CPT

## 2020-11-20 PROCEDURE — 74011250636 HC RX REV CODE- 250/636: Performed by: HOSPITALIST

## 2020-11-20 PROCEDURE — 36415 COLL VENOUS BLD VENIPUNCTURE: CPT

## 2020-11-20 PROCEDURE — 85379 FIBRIN DEGRADATION QUANT: CPT

## 2020-11-20 PROCEDURE — 74011250637 HC RX REV CODE- 250/637: Performed by: INTERNAL MEDICINE

## 2020-11-20 PROCEDURE — 85384 FIBRINOGEN ACTIVITY: CPT

## 2020-11-20 PROCEDURE — 94640 AIRWAY INHALATION TREATMENT: CPT

## 2020-11-20 PROCEDURE — 65270000032 HC RM SEMIPRIVATE

## 2020-11-20 PROCEDURE — 74011636637 HC RX REV CODE- 636/637: Performed by: INTERNAL MEDICINE

## 2020-11-20 RX ORDER — FUROSEMIDE 20 MG/1
20 TABLET ORAL DAILY
Status: DISCONTINUED | OUTPATIENT
Start: 2020-11-20 | End: 2020-11-21 | Stop reason: HOSPADM

## 2020-11-20 RX ADMIN — GUAIFENESIN 600 MG: 600 TABLET, EXTENDED RELEASE ORAL at 19:45

## 2020-11-20 RX ADMIN — DEXAMETHASONE 6 MG: 4 TABLET ORAL at 09:11

## 2020-11-20 RX ADMIN — ALBUTEROL SULFATE 2 PUFF: 90 AEROSOL, METERED RESPIRATORY (INHALATION) at 07:33

## 2020-11-20 RX ADMIN — ENOXAPARIN SODIUM 30 MG: 30 INJECTION SUBCUTANEOUS at 07:01

## 2020-11-20 RX ADMIN — FAMOTIDINE 20 MG: 20 TABLET, FILM COATED ORAL at 09:09

## 2020-11-20 RX ADMIN — ANASTROZOLE 1 MG: 1 TABLET, COATED ORAL at 09:10

## 2020-11-20 RX ADMIN — OXYCODONE HYDROCHLORIDE AND ACETAMINOPHEN 500 MG: 500 TABLET ORAL at 09:11

## 2020-11-20 RX ADMIN — ALBUTEROL SULFATE 2 PUFF: 90 AEROSOL, METERED RESPIRATORY (INHALATION) at 22:22

## 2020-11-20 RX ADMIN — ZINC SULFATE 220 MG (50 MG) CAPSULE 1 CAPSULE: CAPSULE at 09:11

## 2020-11-20 RX ADMIN — OXYCODONE HYDROCHLORIDE AND ACETAMINOPHEN 500 MG: 500 TABLET ORAL at 17:29

## 2020-11-20 RX ADMIN — INSULIN LISPRO 2 UNITS: 100 INJECTION, SOLUTION INTRAVENOUS; SUBCUTANEOUS at 07:00

## 2020-11-20 RX ADMIN — INSULIN LISPRO 2 UNITS: 100 INJECTION, SOLUTION INTRAVENOUS; SUBCUTANEOUS at 17:30

## 2020-11-20 RX ADMIN — FUROSEMIDE 20 MG: 20 TABLET ORAL at 12:51

## 2020-11-20 RX ADMIN — ENOXAPARIN SODIUM 30 MG: 30 INJECTION SUBCUTANEOUS at 19:44

## 2020-11-20 RX ADMIN — ALBUTEROL SULFATE 2 PUFF: 90 AEROSOL, METERED RESPIRATORY (INHALATION) at 14:57

## 2020-11-20 RX ADMIN — FAMOTIDINE 20 MG: 20 TABLET, FILM COATED ORAL at 17:29

## 2020-11-20 RX ADMIN — GUAIFENESIN 600 MG: 600 TABLET, EXTENDED RELEASE ORAL at 09:11

## 2020-11-20 RX ADMIN — Medication 5 ML: at 07:00

## 2020-11-20 NOTE — PROGRESS NOTES
Bedside and Verbal shift change report given to Layton Hsu (oncoming nurse) by Charli Mendoza (offgoing nurse). Report included the following information SBAR, Kardex, MAR and Recent Results.

## 2020-11-20 NOTE — PROGRESS NOTES
Hospitalist Progress Note  [facility]  Answering service: [phone] from in house phone      Date of Service:  2020  NAME:  Braulio Strauss  :  1945  MRN:  [id number]    Admission Summary:   75F p/w sob/cough  Interval history / Subjective: Follow up COVID 19. Patient seen and examined. During encounter, patient 89% on room air. Walked patient in room and dropped to 84% with minimal ambulation. Patient reports not SOB during episode. Manolo Ket to go home. Assessment & Plan:     COVID viral PNA:   Acute hypoxic respiratory failure: persistent   -Continue dexamethasone, vit c, zinc, lovenox per pharmacy protocol  -continue to monitor off antibiotics  -initiate low dose lasix  -continue albuterol, mucinex, incentive spirometer   -up to chair/bed with meals    Hx of Breast Ca: continue Anastrozole    Hyperglycemia:   -Ha1c 6.3, consistent with prediabetes  -continue SSI    Code status: Full  DVT prophylaxis: Lovenox  Care Plan discussed with: Patient/Family and Nurse  Disposition: TBD 1-2 days     Hospital Problems  Date Reviewed: 2018          Codes Class Noted POA    Viral pneumonia ICD-10-CM: J12.9  ICD-9-CM: 480.9  2020 Unknown            Review of Systems:   Pertinent items are mentioned in interval history. Vital Signs:    Last 24hrs VS reviewed since prior progress note. Most recent are:  Visit Vitals  /86 (BP 1 Location: Right arm)   Pulse 63   Temp 98 °F (36.7 °C)   Resp 18   Ht 5' 1\" (1.549 m)   Wt 64.4 kg (141 lb 15.6 oz)   SpO2 92%   BMI 26.83 kg/m²         Intake/Output Summary (Last 24 hours) at 2020 0935  Last data filed at 2020 1930  Gross per 24 hour   Intake 480 ml   Output    Net 480 ml        Physical Examination:   Evaluated face to face and examined 20    General:  Alert, oriented, no acute distress, pleasant.   Resp: no  accessory muscle use, Good AE, no wheezes, diffuse bilateral crackles  Abd:  Soft, non-tender, non-distended  Extremities:  No cyanosis or clubbing, no significant edema  Neuro:  Grossly normal, no focal neuro deficits, follows commands   Psych:  Good insight, not agitated. Data Review:    Review and/or order of clinical lab test  Review and/or order of tests in the radiology section of [facility]  Review and/or order of tests in the medicine section of [facility]  Labs:     Recent Labs     11/19/20  1037   WBC 13.6*   HGB 10.8*   HCT 31.7*        Recent Labs     11/19/20  1037   *   K 3.2*      CO2 22   BUN 17   CREA 0.79   *   CA 8.6     Recent Labs     11/19/20  1037   ALT 28   AP 92   TBILI 0.6   TP 7.1   ALB 2.7*   GLOB 4.4*     No results for input(s): INR, PTP, APTT, INREXT, INREXT in the last 72 hours. Recent Labs     11/18/20  0403   FERR 705*      No results found for: FOL, RBCF   No results for input(s): PH, PCO2, PO2 in the last 72 hours. No results for input(s): CPK, CKNDX, TROIQ in the last 72 hours.     No lab exists for component: CPKMB  Lab Results   Component Value Date/Time    Cholesterol, total 111 07/13/2018 03:40 AM    HDL Cholesterol 42 07/13/2018 03:40 AM    LDL, calculated 56.2 07/13/2018 03:40 AM    Triglyceride 64 07/13/2018 03:40 AM    CHOL/HDL Ratio 2.6 07/13/2018 03:40 AM     Lab Results   Component Value Date/Time    Glucose (POC) 147 (H) 11/20/2020 06:56 AM    Glucose (POC) 184 (H) 11/19/2020 09:47 PM    Glucose (POC) 183 (H) 11/19/2020 05:17 PM    Glucose (POC) 132 (H) 11/19/2020 11:43 AM     Lab Results   Component Value Date/Time    Color YELLOW/STRAW 07/13/2018 02:46 AM    Appearance CLEAR 07/13/2018 02:46 AM    Specific gravity 1.021 07/13/2018 02:46 AM    pH (UA) 7.0 07/13/2018 02:46 AM    Protein NEGATIVE  07/13/2018 02:46 AM    Glucose NEGATIVE  07/13/2018 02:46 AM    Ketone 15 (A) 07/13/2018 02:46 AM    Bilirubin NEGATIVE  07/13/2018 02:46 AM    Urobilinogen 1.0 07/13/2018 02:46 AM    Nitrites NEGATIVE  07/13/2018 02:46 AM    Leukocyte Esterase NEGATIVE  07/13/2018 02:46 AM    Epithelial cells FEW 07/13/2018 02:46 AM    Bacteria NEGATIVE  07/13/2018 02:46 AM    WBC 0-4 07/13/2018 02:46 AM    RBC 0-5 07/13/2018 02:46 AM     Medications Reviewed:     Current Facility-Administered Medications   Medication Dose Route Frequency    furosemide (LASIX) tablet 20 mg  20 mg Oral DAILY    dexAMETHasone (DECADRON) tablet 6 mg  6 mg Oral DAILY    glucose chewable tablet 16 g  4 Tab Oral PRN    glucagon (GLUCAGEN) injection 1 mg  1 mg IntraMUSCular PRN    dextrose 10% infusion 0-250 mL  0-250 mL IntraVENous PRN    insulin lispro (HUMALOG) injection   SubCUTAneous AC&HS    guaiFENesin ER (MUCINEX) tablet 600 mg  600 mg Oral Q12H    albuterol (PROVENTIL HFA, VENTOLIN HFA, PROAIR HFA) inhaler 2 Puff  2 Puff Inhalation TID RT    zinc sulfate (ZINCATE) 220 (50) mg capsule 1 Cap  1 Cap Oral DAILY    ascorbic acid (vitamin C) (VITAMIN C) tablet 500 mg  500 mg Oral BID    anastrozole (ARIMIDEX) tablet 1 mg  1 mg Oral DAILY    enoxaparin (LOVENOX) injection 30 mg  30 mg SubCUTAneous Q12H    guaiFENesin-dextromethorphan (ROBITUSSIN DM) 100-10 mg/5 mL syrup 5 mL  5 mL Oral Q4H PRN    sodium chloride (NS) flush 5-40 mL  5-40 mL IntraVENous Q8H    sodium chloride (NS) flush 5-40 mL  5-40 mL IntraVENous PRN    acetaminophen (TYLENOL) tablet 650 mg  650 mg Oral Q6H PRN    Or    acetaminophen (TYLENOL) suppository 650 mg  650 mg Rectal Q6H PRN    polyethylene glycol (MIRALAX) packet 17 g  17 g Oral DAILY PRN    ondansetron (ZOFRAN ODT) tablet 4 mg  4 mg Oral Q8H PRN    Or    ondansetron (ZOFRAN) injection 4 mg  4 mg IntraVENous Q6H PRN    famotidine (PEPCID) tablet 20 mg  20 mg Oral BID   ______________________________________________________________________  EXPECTED LENGTH OF STAY: 5d 12h  ACTUAL LENGTH OF STAY:          [address] DO

## 2020-11-20 NOTE — PROGRESS NOTES
Problem: Falls - Risk of  Goal: *Absence of Falls  Description: Document Miryam Roselia Fall Risk and appropriate interventions in the flowsheet.   Outcome: Progressing Towards Goal  Note: Fall Risk Interventions:            Medication Interventions: Patient to call before getting OOB         History of Falls Interventions: Evaluate medications/consider consulting pharmacy, Room close to nurse's station

## 2020-11-20 NOTE — PROGRESS NOTES
Bedside shift change report given to [address] (oncoming nurse) by Iveth Hollis RN (offgoing nurse). Report included the following information SBAR, Kardex, MAR and Recent Results.

## 2020-11-21 VITALS
HEART RATE: 59 BPM | WEIGHT: 141.98 LBS | DIASTOLIC BLOOD PRESSURE: 76 MMHG | SYSTOLIC BLOOD PRESSURE: 117 MMHG | OXYGEN SATURATION: 92 % | HEIGHT: 61 IN | RESPIRATION RATE: 16 BRPM | BODY MASS INDEX: 26.81 KG/M2 | TEMPERATURE: 97.7 F

## 2020-11-21 LAB
ANION GAP SERPL CALC-SCNC: 9 MMOL/L (ref 5–15)
BUN SERPL-MCNC: 19 MG/DL (ref 6–20)
BUN/CREAT SERPL: 31 (ref 12–20)
CALCIUM SERPL-MCNC: 8.8 MG/DL (ref 8.5–10.1)
CHLORIDE SERPL-SCNC: 104 MMOL/L (ref 97–108)
CO2 SERPL-SCNC: 23 MMOL/L (ref 21–32)
COMMENT, HOLDF: NORMAL
CREAT SERPL-MCNC: 0.62 MG/DL (ref 0.55–1.02)
CRP SERPL-MCNC: 4.75 MG/DL (ref 0–0.6)
D DIMER PPP FEU-MCNC: 0.61 MG/L FEU (ref 0–0.65)
FERRITIN SERPL-MCNC: 771 NG/ML (ref 8–252)
FIBRINOGEN PPP-MCNC: 540 MG/DL (ref 200–475)
GLUCOSE BLD STRIP.AUTO-MCNC: 107 MG/DL (ref 65–100)
GLUCOSE BLD STRIP.AUTO-MCNC: 143 MG/DL (ref 65–100)
GLUCOSE SERPL-MCNC: 145 MG/DL (ref 65–100)
LDH SERPL L TO P-CCNC: 268 U/L (ref 81–246)
POTASSIUM SERPL-SCNC: 3.8 MMOL/L (ref 3.5–5.1)
SAMPLES BEING HELD,HOLD: NORMAL
SERVICE CMNT-IMP: ABNORMAL
SERVICE CMNT-IMP: ABNORMAL
SODIUM SERPL-SCNC: 136 MMOL/L (ref 136–145)

## 2020-11-21 PROCEDURE — 85379 FIBRIN DEGRADATION QUANT: CPT

## 2020-11-21 PROCEDURE — 82962 GLUCOSE BLOOD TEST: CPT

## 2020-11-21 PROCEDURE — 74011250637 HC RX REV CODE- 250/637: Performed by: INTERNAL MEDICINE

## 2020-11-21 PROCEDURE — 74011250636 HC RX REV CODE- 250/636: Performed by: INTERNAL MEDICINE

## 2020-11-21 PROCEDURE — 74011250636 HC RX REV CODE- 250/636: Performed by: HOSPITALIST

## 2020-11-21 PROCEDURE — 85384 FIBRINOGEN ACTIVITY: CPT

## 2020-11-21 PROCEDURE — 94640 AIRWAY INHALATION TREATMENT: CPT

## 2020-11-21 PROCEDURE — 80048 BASIC METABOLIC PNL TOTAL CA: CPT

## 2020-11-21 PROCEDURE — 82728 ASSAY OF FERRITIN: CPT

## 2020-11-21 PROCEDURE — 74011250637 HC RX REV CODE- 250/637: Performed by: HOSPITALIST

## 2020-11-21 PROCEDURE — 83615 LACTATE (LD) (LDH) ENZYME: CPT

## 2020-11-21 PROCEDURE — 36415 COLL VENOUS BLD VENIPUNCTURE: CPT

## 2020-11-21 PROCEDURE — 86140 C-REACTIVE PROTEIN: CPT

## 2020-11-21 RX ORDER — FUROSEMIDE 20 MG/1
20 TABLET ORAL DAILY
Qty: 4 TAB | Refills: 0 | Status: SHIPPED | OUTPATIENT
Start: 2020-11-22 | End: 2020-11-26

## 2020-11-21 RX ADMIN — FUROSEMIDE 20 MG: 20 TABLET ORAL at 08:37

## 2020-11-21 RX ADMIN — ALBUTEROL SULFATE 2 PUFF: 90 AEROSOL, METERED RESPIRATORY (INHALATION) at 07:55

## 2020-11-21 RX ADMIN — ZINC SULFATE 220 MG (50 MG) CAPSULE 1 CAPSULE: CAPSULE at 08:37

## 2020-11-21 RX ADMIN — ANASTROZOLE 1 MG: 1 TABLET, COATED ORAL at 08:36

## 2020-11-21 RX ADMIN — GUAIFENESIN 600 MG: 600 TABLET, EXTENDED RELEASE ORAL at 08:37

## 2020-11-21 RX ADMIN — ENOXAPARIN SODIUM 30 MG: 30 INJECTION SUBCUTANEOUS at 07:07

## 2020-11-21 RX ADMIN — FAMOTIDINE 20 MG: 20 TABLET, FILM COATED ORAL at 08:36

## 2020-11-21 RX ADMIN — DEXAMETHASONE 6 MG: 4 TABLET ORAL at 08:37

## 2020-11-21 RX ADMIN — OXYCODONE HYDROCHLORIDE AND ACETAMINOPHEN 500 MG: 500 TABLET ORAL at 08:36

## 2020-11-21 RX ADMIN — Medication 10 ML: at 06:28

## 2020-11-21 NOTE — PROGRESS NOTES
Problem: Falls - Risk of  Goal: *Absence of Falls  Description: Document Payam Apo Fall Risk and appropriate interventions in the flowsheet. Outcome: Progressing Towards Goal  Note: Fall Risk Interventions:            Medication Interventions: Patient to call before getting OOB         History of Falls Interventions:  Investigate reason for fall         Problem: Patient Education: Go to Patient Education Activity  Goal: Patient/Family Education  Outcome: Progressing Towards Goal

## 2020-11-21 NOTE — PROGRESS NOTES
Transition of Care Plan   RUR: 14% low   Disposition: discharging home today to [facility] Transport: Sister Jessie Mijares [phone]    CM spoke with Deana Guerrero at the [facility]. Confirmed patient able to return. Sister Jessie Mijares will be picking patient up today at 1:30pm however, she is unable to come inside as she is COVID+ and does not have a cell phone. She is planning to come to the ED and notify from [facility] staff that she is here to pick patient up and for staff to contact patients nurse on 2N that her transportation has arrived. CM communicated this plan with patient's nurse. Medicare pt has received, reviewed, and signed 2nd IM letter informing them of their right to appeal the discharge. Signed copy has been placed on pt bedside chart.       Elli Jones, BURAK

## 2020-11-21 NOTE — PROGRESS NOTES
Problem: Falls - Risk of  Goal: *Absence of Falls  Description: Document Winston Going Fall Risk and appropriate interventions in the flowsheet.   Outcome: Resolved/Not Met  Note: Fall Risk Interventions:            Medication Interventions: Patient to call before getting OOB         History of Falls Interventions: Room close to nurse's station

## 2020-11-21 NOTE — PROGRESS NOTES
Problem: Breathing Pattern - Ineffective  Goal: *Absence of hypoxia  Outcome: Resolved/Met  Goal: *Use of effective breathing techniques  Outcome: Progressing Towards Goal

## 2020-11-21 NOTE — DISCHARGE SUMMARY
Discharge Summary       PATIENT ID: Mily Muñoz  MRN: [id number]   YOB: 1945    DATE OF ADMISSION: 11/16/2020  6:15 PM    DATE OF DISCHARGE: 11/21/2020  PRIMARY CARE PROVIDER: Naomi Robledo MD     ATTENDING PHYSICIAN: Ester Mcclain DO   DISCHARGING PROVIDER: Ester Mcclain DO    To contact this individual call [phone] and ask the  to page. If unavailable ask to be transferred the Adult Hospitalist Department. CONSULTATIONS: None    PROCEDURES/SURGERIES: * No surgery found *    ADMITTING DIAGNOSES & HOSPITAL COURSE:     70-year-old female with PMH history of breast cancer on anastrozole, presenting to [facility] with 1 week history of malaise, fatigue, body aches. Patient resides at [facility] and had known Covid 19+ contacts. In the ED, patient found to have fever. She was admitted for further evaluation. Chest x-ray demonstrated bilateral patchy nodular airspace disease, consistent with COVID-19. She was initiated on dexamethasone, vitamin C, zinc.  She overall improved and was stable to discharge home with self quarantine until 11/26/2020.     Chest xray:  IMPRESSION:  1. Bilateral patchy nodular areas of airspace disease consistent with pneumonia  such as viral pneumonia. DISCHARGE DIAGNOSES / PLAN:      COVID viral PNA:   Acute hypoxic respiratory failure: resolved  -s/p dexamethasone, vit c, zinc, lovenox during hospitalization  -continue to monitor off antibiotics  -low dose lasix- continue on discharge  -continue albuterol inhaler     Hx of Breast Ca: continue Anastrozole     Hyperglycemia:   -Ha1c 6.3, consistent with prediabetes  -s/p SSI while hospitalized     ADDITIONAL CARE RECOMMENDATIONS:   New medications:   1. Lasix. Take once daily for 4 days, starting 11/22/2020  2. Albuterol inhaler.  Take 2 puffs three times a day for 10 days    You may come out of quarantine 11/26/2020 (10 days from prior test)    Please return to the hospital for recurrent or worsening symptoms        PENDING TEST RESULTS:   At the time of discharge the following test results are still pending: none    FOLLOW UP APPOINTMENTS:    Follow-up Information     Follow up With Specialties Details Why Contact Info    Coleman Lynn MD Internal Medicine   [address]  [phone]            DISCHARGE MEDICATIONS:  Current Discharge Medication List      START taking these medications    Details   furosemide (Lasix) 20 mg tablet Take 1 Tab by mouth daily for 4 days. Qty: 4 Tab, Refills: 0      albuterol sulfate 90 mcg/actuation aebs Take 2 Puffs by inhalation three (3) times daily for 10 days. Qty: 1 Inhaler, Refills: 0         CONTINUE these medications which have NOT CHANGED    Details   anastrozole (Arimidex) 1 mg tablet Take 1 mg by mouth daily. multivit-min-FA-lycopen-lutein (Centrum Silver) 0.4-300-250 mg-mcg-mcg tab Take 1 Tab by mouth daily. calcium-vitamin D (OS-ISIDRO +D3) 500 mg-200 unit per tablet Take 1 Tab by mouth daily. NOTIFY YOUR PHYSICIAN FOR ANY OF THE FOLLOWING:   Fever over 101 degrees for 24 hours. Chest pain, shortness of breath, fever, chills, nausea, vomiting, diarrhea, change in mentation, falling, weakness, bleeding. Severe pain or pain not relieved by medications. Or, any other signs or symptoms that you may have questions about. DISPOSITION:  x  Home With:   OT  PT  HH  RN       Long term SNF/Inpatient Rehab    Independent/assisted living    Hospice    Other:       PATIENT CONDITION AT DISCHARGE:     Functional status    Poor     Deconditioned    x Independent      Cognition   x  Lucid     Forgetful     Dementia      Catheters/lines (plus indication)    Viveros     PICC     PEG    x None      Code status    x Full code     DNR      PHYSICAL EXAMINATION AT DISCHARGE:  General:  Alert, oriented, no acute distress, pleasant.   Resp: no  accessory muscle use, Good AE, no wheezes,  bibasilar crackles  Abd:  Soft, non-tender, non-distended  Extremities:  No cyanosis or clubbing, no significant edema  Neuro:  Grossly normal, no focal neuro deficits, follows commands   Psych:  Good insight, not agitated.     CHRONIC MEDICAL DIAGNOSES:  Problem List as of 11/21/2020 Date Reviewed: 8/8/2018          Codes Class Noted - Resolved    Viral pneumonia ICD-10-CM: J12.9  ICD-9-CM: 480.9  11/16/2020 - Present        [facility] ([facility] ICD-10-CM: C54.1  ICD-9-CM: 182.0  8/8/2018 - Present        Pathological fracture of right hip [facility]) ICD-10-CM: M84.451A  ICD-9-CM: 733.14  7/13/2018 - Present        Pathologic fracture ICD-10-CM: M84.40XA  ICD-9-CM: 733.10  7/13/2018 - Present        Cataract of right eye ICD-10-CM: H26.9  ICD-9-CM: 366.9  2/8/2013 - Present              Greater than 30 minutes were spent with the patient on counseling and coordination of care    Signed:   [address] [facility], DO  11/21/2020  9:20 AM

## 2020-11-21 NOTE — ROUTINE PROCESS
Bedside shift change report given to Noah (oncoming nurse) by Roberto Lackey (offgoing nurse). Report included the following information SBAR, Kardex, Intake/Output, MAR and Recent Results.

## 2020-11-21 NOTE — DISCHARGE INSTRUCTIONS
Discharge Instructions       PATIENT ID: Matt Steel  MRN: [id number]   YOB: 1945    DATE OF ADMISSION: 11/16/2020  6:15 PM    DATE OF DISCHARGE: 11/21/2020    PRIMARY CARE PROVIDER: Karen Ramey MD     ATTENDING PHYSICIAN: Carissa Atwood DO  DISCHARGING PROVIDER: [address] [facility]     To contact this individual call [phone] and ask the  to page. If unavailable ask to be transferred the Adult Hospitalist Department. DISCHARGE DIAGNOSES     COVID 19  Respiratory failure    CONSULTATIONS: None    PROCEDURES/SURGERIES: * No surgery found *    PENDING TEST RESULTS:   At the time of discharge the following test results are still pending: none    FOLLOW UP APPOINTMENTS:   Follow-up Information     Follow up With Specialties Details Why Contact Info    Karen Ramey MD Internal Medicine   [address]  [phone]             ADDITIONAL CARE RECOMMENDATIONS:     New medications:   1. Lasix. Take once daily for 4 days, starting 11/22/2020  2. Albuterol inhaler. Take 2 puffs three times a day for 10 days    You may come out of quarantine 11/26/2020 (10 days from prior test)    Please return to the hospital for recurrent or worsening symptoms     DISCHARGE MEDICATIONS:   See Medication Reconciliation Form    · It is important that you take the medication exactly as they are prescribed. · Keep your medication in the bottles provided by the pharmacist and keep a list of the medication names, dosages, and times to be taken in your wallet. · Do not take other medications without consulting your doctor. NOTIFY YOUR PHYSICIAN FOR ANY OF THE FOLLOWING:   Fever over 101 degrees for 24 hours. Chest pain, shortness of breath, fever, chills, nausea, vomiting, diarrhea, change in mentation, falling, weakness, bleeding. Severe pain or pain not relieved by medications. Or, any other signs or symptoms that you may have questions about.       Signed: Tab Salvador DO  11/21/2020  8:59 AM    Patient Education        Learning About Coronavirus ([id number])  Coronavirus (COVID-19): Overview  What is coronavirus ([id number])? The coronavirus disease (COVID-19) is caused by a virus. It is an illness that was first found in December 2019. It has since spread worldwide. The virus can cause fever, cough, and trouble breathing. In severe cases, it can cause pneumonia and make it hard to breathe without help. It can cause death. This virus spreads person-to-person through droplets from coughing and sneezing. It can also spread when you are close to someone who is infected. And it can spread when you touch something that has the virus on it, such as a doorknob or a tabletop. Coronaviruses are a large group of viruses. They cause the common cold. They also cause more serious illnesses like Middle East respiratory syndrome (MERS) and severe acute respiratory syndrome (SARS). COVID-19 is caused by a novel coronavirus. That means it's a new type that has not been seen in people before. How is COVID-19 treated? Mild illness can be treated at home, but more serious illness needs to be treated in the hospital. Treatment may include medicines to reduce symptoms, plus breathing support such as oxygen therapy or a ventilator. Other treatments, such as antiviral medicines, may help people who have COVID-19. What can you do to protect yourself from COVID-19? The best way to protect yourself from getting sick is to:  · Avoid areas where there is an outbreak. · Avoid contact with people who may be infected. · Avoid crowds and try to stay at least 6 feet away from other people. · Wash your hands often, especially after you cough or sneeze. Use soap and water, and scrub for at least 20 seconds. If soap and water aren't available, use an alcohol-based hand . · Avoid touching your mouth, nose, and eyes. What can you do to avoid spreading the virus to others?   To help avoid spreading the virus to others:  · Wash your hands often with soap or alcohol-based hand sanitizers. · Cover your mouth with a tissue when you cough or sneeze. Then throw the tissue in the trash. · Use a disinfectant to clean things that you touch often. These include doorknobs, remote controls, phones, and handles on your refrigerator and microwave. And don't forget countertops, tabletops, bathrooms, and computer keyboards. · Wear a cloth face cover if you have to go to public areas. If you know or suspect that you have COVID-19:  · Stay home. Don't go to school, work, or public areas. And don't use public transportation, ride-shares, or taxis unless you have no choice. · Leave your home only if you need to get medical care or testing. But call the doctor's office first so they know you're coming. And wear a face cover. · Limit contact with people in your home. If possible, stay in a separate bedroom and use a separate bathroom. · Wear a face cover whenever you're around other people. It can help stop the spread of the virus when you cough or sneeze. · Clean and disinfect your home every day. Use household  and disinfectant wipes or sprays. Take special care to clean things that you grab with your hands. · Self-isolate until it's safe to be around others again. ? If you have symptoms, it's safe when you haven't had a fever for 3 days and your symptoms have improved and it's been at least 10 days since your symptoms started. ? If you were exposed to the virus but don't have symptoms, it's safe to be around others 14 days after exposure. ? Talk to your doctor about whether you also need testing, especially if you have a weakened immune system. When to call for help  Call 911 anytime you think you may need emergency care. For example, call if:  · You have severe trouble breathing. (You can't talk at all.)  · You have constant chest pain or pressure.   · You are severely dizzy or lightheaded. · You are confused or can't think clearly. · Your face and lips have a blue color. · You passed out (lost consciousness) or are very hard to wake up. Call your doctor now if you develop symptoms such as:  · Shortness of breath. · Fever. · Cough. If you need to get care, call ahead to the doctor's office for instructions before you go. Make sure you wear a face cover to prevent exposing other people to the virus. Where can you get the latest information? The following health organizations are tracking and studying this virus. Their websites contain the most up-to-date information. [employer] also learn what to do if you think you may have been exposed to the virus. · U.S. Centers for Disease Control and Prevention (CDC): The CDC provides updated news about the disease and travel advice. The website also tells you how to prevent the spread of infection. www.cdc.gov  · World Health Organization [facility]): WHO offers information about the virus outbreaks. WHO also has travel advice. www.who.int  Current as of: July 10, 2020               Content Version: 12.6  © 2917-3118 [employer]. Care instructions adapted under license by [employer] (which disclaims liability or warranty for this information). If you have questions about a medical condition or this instruction, always ask your healthcare professional. [address] any warranty or liability for your use of this information.

## 2020-11-23 ENCOUNTER — PATIENT OUTREACH (OUTPATIENT)
Dept: CASE MANAGEMENT | Age: 75
End: 2020-11-23

## 2020-11-23 NOTE — PROGRESS NOTES
Patient contacted regarding COVID-19 diagnosis. Discussed COVID-19 related testing which was available at this time. Test results were positive. Patient informed of results, if available? yes. Outreach made within 2 business days of discharge: Yes    Care Transition Nurse/ Ambulatory Care Manager/ LPN Care Coordinator contacted the caregiver by telephone to perform post discharge assessment. Verified name and  with caregiver as identifiers. Provided introduction to self, and explanation of the CTN/ACM/LPN role, and reason for call due to risk factors for infection and/or exposure to COVID-19. Symptoms reviewed with caregiver who verbalized the following symptoms: SPO2 down to 93%, and temp at 99.0. Due to concerning symptoms care giver is giving ordered inhaler treatment and Tylenol. Aware to call PCP or return pt to hospital if symptoms worsen. Discussed follow-up appointments. If no appointment was previously scheduled, appointment scheduling offered: [facility] follow up appointment(s):   Future Appointments   Date Time Provider Ben Petersen   2021  2:00 PM Deborah Mittal [address]     [facility] follow up appointment(s): urged caregiver to call for appointment      Advance Care Planning:   Does patient have an Advance Directive: currently not on file; patient declined education    Patient has following risk factors of: immunocompromised. CTN/ACM/LPN reviewed discharge instructions, medical action plan and red flags such as increased shortness of breath, increasing fever and signs of decompensation with caregiver who verbalized understanding. Discussed exposure protocols and quarantine with CDC Guidelines What to do if you are sick with coronavirus disease .  Caregiver was given an opportunity for questions and concerns.  The caregiver agrees to contact the [facility] exposure line [phone], [facility] department [address]  ([phone]) and PCP office for questions related to their healthcare. CTN/ACM provided contact information for future needs. Reviewed and educated caregiver on any new and changed medications related to discharge diagnosis. Patient/family/caregiver given information for [employer] and agrees to enroll no  14 day call based on severity of symptoms and risk factors.

## 2020-12-04 ENCOUNTER — PATIENT OUTREACH (OUTPATIENT)
Dept: CASE MANAGEMENT | Age: 75
End: 2020-12-04

## 2020-12-04 NOTE — PROGRESS NOTES
Patient resolved from Transition of Care episode on 11/21. Patient/family has been provided the following resources and education related to COVID-19:                         Signs, symptoms and red flags related to COVID-19            CDC exposure and quarantine guidelines            [employer] exposure contact - [phone]            Contact for their local Department of Health                 Patient currently reports that the following symptoms have improved:  all symptoms improved. SPo2 up to 99%     No further outreach scheduled with this CTN/ACM. Episode of Care resolved. Patient has this CTN/ACM contact information if future needs arise.

## 2021-01-04 ENCOUNTER — APPOINTMENT (OUTPATIENT)
Dept: PHYSICAL THERAPY | Age: 76
End: 2021-01-04
Payer: MEDICARE

## 2021-01-25 ENCOUNTER — HOSPITAL ENCOUNTER (OUTPATIENT)
Dept: PHYSICAL THERAPY | Age: 76
Discharge: HOME | End: 2021-01-25
Payer: MEDICARE

## 2021-01-25 VITALS — WEIGHT: 140.6 LBS | HEIGHT: 61 IN | BODY MASS INDEX: 26.55 KG/M2 | TEMPERATURE: 95.3 F

## 2021-01-25 PROCEDURE — 97161 PT EVAL LOW COMPLEX 20 MIN: CPT

## 2021-01-25 PROCEDURE — 97140 MANUAL THERAPY 1/> REGIONS: CPT

## 2021-01-25 NOTE — PROGRESS NOTES
[facility]  Lymphedema Clinic  [address]    INITIAL EVALUATION    NAME: Esperanza Arce AGE: 76 y.o. GENDER: female  DATE: 1/25/2021  REFERRING PHYSICIAN: Dr. Jamie Sheets:   Primary Diagnosis:  ?  B LE lymphedema, secondary (I89.0)  Other Treatment Diagnoses:  ? Other abnormalities of gait and mobility (R26.89)  ? Swelling not relieved by elevation (R60.9)  ? 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 Patient had Covid in November and was hospitalized, but is now doing well. Patient recently received 2 pairs of  new thigh high compression stockings, 30-40mmHg Sig Varis from another resident of the [facility]. She reports she is exercising daily, wears her compression daily but she is not performing skin care consistently. Blake Astudillo is present today and she assist her at home. Right lower extremity swelling was first noted in November of 2018. Patient was diagnosed with endometrial cancer with a tumor in the right hip. She underwent a hip replacement in July of 2018, radiation treatments in September of 2018 and chemotherapy. [facility] Lymphedema Assessment Scale: 8/20.   Past Medical History:   Past Medical History:   Diagnosis Date    Endometrial cancer ([address]     mets to right hip and lungs     Past Surgical History:   Procedure Laterality Date    ABDOMEN SURGERY PROC UNLISTED      HX APPENDECTOMY  1975    HX HEENT  2006    OS Cataract    HX HIP REPLACEMENT Right      Current Medications:    Current Outpatient Medications   Medication Sig    anastrozole (Arimidex) 1 mg tablet Take 1 mg by mouth daily.  multivit-min-FA-lycopen-lutein (Centrum Silver) 0.4-300-250 mg-mcg-mcg tab Take 1 Tab by mouth daily.  calcium-vitamin D (OS-ISIDRO +D3) 500 mg-200 unit per tablet Take 1 Tab by mouth daily. No current facility-administered medications for this encounter. 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 She enjoys gardening. Patient's caregiver, Sister Julienne Luna is present with her today and she helps her don her stockings and with any care she needs. Living Situation: Resides in a [facility] Caregiver?: Yes, Sister Julienne Luna is present today  Mobility: Independent gait without any assistive device for community distances  Sleeping Arrangement:  in bed with legs elevated  Adaptive Equipment Owned:  Sleeps in hospital bed at night and elevates her leg  Other: Patient is able to assist with donning compression stockings and does have assistance. Sister Julienne Luna puts them on her lower legs for her and then patient pulls them up the rest of the way. Previous Therapy:  Patient has been treated at [facility] 4/4/19 to 5/21/19 and 1/2/2020. Compression/Lymphedema Equipment: Patient is wearing bilateral lower extremity Sig varis 30-40mmHg thigh highs with closed toes and silicone band. She has 2 new pairs. Patient has [employer] tribute with compression jacket that she wears each night    SUBJECTIVE:  My leg was more swollen when I got home from the hospital after I had Covid. I elevate my legs at night and I do the [employer] exercises in the morning and the evening. I am working on going up and down the stairs. Some days I can do it better than others. I use my left leg to help me do the steps.    Patients goals for therapy: reassess swelling. OBJECTIVE DATA SUMMARY:   EXAMINATION/PRESENTATION/DECISION MAKING:   Temperature: 95.3 degrees    Pain: No complaints of pain. Pain Scale 1: Numeric (0 - 10)  Pain Intensity 1: 0  Pain Location 1: Leg  Patient Stated Pain Goal: 0    Self-Care and ADLs: Modified independent but she does have difficulty reaching her feet so Sister Amparo Juarez initiates stocking donning for her to the knee. Skin and Tissue Assessment:  Dermal Status: Intact, Dry and Flaky    Texture/Consistency: Boggy right knee and thigh    Pigmentation/Color Change: Hyperpigmented right leg    Anomalies: N/A    Circulatory: Vascular studies ruled out DVT in past    Nails: Normal    Stemmers Sign:  negative    Height:  Height: 5' 1\" (154.9 cm)  Weight:  Weight: 63.8 kg (140 lb 9.6 oz) Was 134lbs 9.6oz last assessment 1/2/2020  BMI:  BMI (calculated): 26.6  (36 or greater: adversely affecting lymphedema)  Wound/Ulcer:  N/A        Volumetric Measurements:   Right:  6,833.84mL (increased by 133.56 since last assessment 1/2/2020) Left:  7,084.79 mL (increased by 299.0ml since last assessment 1/2/2020   % Difference: right leg is 3.54% smaller than the left versus 1.26% smaller on 1/2/2020. Dominance: right   (See scanned graph)  Bilateral leg volumes are increased but the left leg is increased more than the affected right leg. Her weight is increased and volume increases are most likely weight increases at this time. Range of Motion: [facility] for bilateral lower extremities  Strength: Not formally assessed 2* patient is able to complete all functional activities in the clinic today.   Sensation:  Intact     Mobility:    Bed/Chair Mobility: Modified independent to minimum assistance for supine to sit  Transfers: Modified independent  Gait: Independent  Endurance: good     Safety:  Patient is alert and oriented: yes  Safety awareness: good  Fall risk?: low  Patient given written fall prevention handout: Yes    Based on the above components, the patient evaluation is determined to be of the following complexity level: LOW     Evaluation Time: 3706-5220  30 minutes    TREATMENT PROVIDED:   1. Treatment description:  Therapeutic Exercise and Procedure:  Patient is skilled in her home exercise program and is performing Pepe [employer] exercises twice a day, once in the am and once in the evening. No review of these exercises is required today. She was encouraged to walk for exercise as well and she reports that she has been doing the steps frequently for exercise. She does report that she sometimes sits for extended periods of time and she is encouraged to get up from her chair and walk/move around every 45 minutes. Treatment time:  N/A  Minutes: 0    2. Treatment description:  Manual Therapy:  Patient instructed patient/caregiver skin care principles with low pH lotion. Patient's skin is dry today distally so reviewed skin care with low pH lotion and application following the principles of manual lymph drainage. Therapist demonstrated manual lymph drainage basic techniques today and patient redemonstrated. She reports she still has her handout at home. Therapist performed skin care with Remedy lotion for bilateral lower extremities in the clinic. Discussed the reason for good skin care and the complications that can occur such as cellulitis infections and chronic wounds. Educated patient and caregiver to monitor for the signs of cellulitis. Discussed and assessed current compression garments and patient is continuing to wear her shapewear with thigh high compression garments. She arrived wearing a correctly fitting new pair of size small short Sig varis thigh highs compression class 2. Garments fit well. Patient received these thigh high from another nun along with a another pair. Reviewed garment lifespan and why they need to be replaced at the end of that 4-6 months lifespan.    Garments were donned by therapist bilaterally. Reviewed donning process and donated a Juzo donning to patient to assist with donning and doffing and foot adjustments with the stockings. Educated patient in how to use the pad and patient redemonstrated independent. Provided a second donated pad for Sister John Rajput to use at the [facility]. Educated patient's caregiver, Cookie Pack with tips on how to don the stockings over the foot and ankle and demonstrated the techniques including turning the stocking inside out to the foot. Sister John Rajput has trouble with her wrists bilaterally so problem solved and made recommendations to assist her with the process of donning stockings on Sister Maite Valverde. Therapist redonned bilateral lower extremity Sig Varis thigh high stockings on patient to leave the clinic. Discussed re-evaluations and patient is interested in returning in one year when she will require new compression garments. Therapist agreed but encouraged Sister John Rajput and Sister Maite Valverde to call the clinic if she has any problems or needs prior to that scheduled follow-up appt. They verbalized understanding. Treatment time:  3668-0205 Minutes: 48    ASSESSMENT:   Sera Black is a 76 y.o. female who presents with right lower extremity, stage 2  lymphedema secondary to a history of endometrial cancer with metastasis to the right hip resulting in a right hip replacement. Swelling is primarily present proximally from right trunk to the knee. Patient's right lower extremity limb volume is stable and the right leg is currently measuring smaller than the left. She is independent with her home management program.  She would benefit from more consistent skin care and this was reviewed with her today. Patient is motivated to improve her condition. No additional treatment visits are required at this time.    Patient continues to benefit from complete decongestive therapy (CDT) including: Manual lymphatic drainage (MLD) technique and Short-stretch textile bandages/compression system to decongest limb as appropriate. This care is medically necessary due to the infection risk with lymphedema and to improve functional activities. CDT is necessary to resolve swelling to allow patient to return to wearing normal clothes/footwear and prevent worsening of symptoms, such as infections and/or hospitalizations. Patient will be independent with home program strategies to allow improved ADL ability and mobility and to allow patient to return to greatest functional independence. Rehabilitation potential is considered to be Good. Factors which may influence rehabilitation potential include excellent support from [facility], but limited funds for compression purchasing. Patient will benefit from a re-evaluation of her swelling status and compression product needs in 1 year. Sister Magdalena Barros and Sister Brian Ron are to call if patient has any needs prior to that follow-up visit. PLAN OF CARE:   Recommendations and Planned Interventions: Manual lymph drainage/decongestive therapy, Compression garment fitting/provision, Self-care training, Education in skin care and lymphedema precautions, Self-MLD education per home program and Caregiver education as needed    GOALS   Patient to continue with home lymphedema management program including daily compression garment wearing schedule, daily skin care, exercise, and self manual lymph drainage. Patient will return to the clinic in 1 year for re-evaluation, but they are to call if she has any needs prior to that time. Patient has participated in goal setting and agrees to work toward plan of care. Patient was instructed to call if questions or concerns arise. Thank you for this referral.   ADDISON Teran, [place]   Time Calculation: 80 mins    TREATMENT PLAN EFFECTIVE DATES:   1/25/2021 TO 1/25/2021  I have read the above plan of care for [facility].   I certify the above prescribed services are required by this patient and are medically necessary.   The above plan of care has been developed in conjunction with the lymphedema/physical therapist.       Physician Signature: ____________________________________Date:______________                                         Dr. Katelynn Salazar

## 2022-01-18 ENCOUNTER — HOSPITAL ENCOUNTER (OUTPATIENT)
Dept: PHYSICAL THERAPY | Age: 77
Discharge: HOME | End: 2022-01-18

## 2022-03-01 ENCOUNTER — HOSPITAL ENCOUNTER (OUTPATIENT)
Dept: PHYSICAL THERAPY | Age: 77
Discharge: HOME | End: 2022-03-01
Payer: MEDICARE

## 2022-03-01 VITALS — WEIGHT: 145.4 LBS | BODY MASS INDEX: 27.45 KG/M2 | HEIGHT: 61 IN

## 2022-03-01 PROCEDURE — 97140 MANUAL THERAPY 1/> REGIONS: CPT

## 2022-03-01 PROCEDURE — 97161 PT EVAL LOW COMPLEX 20 MIN: CPT

## 2022-03-01 PROCEDURE — 97110 THERAPEUTIC EXERCISES: CPT

## 2022-03-01 NOTE — PROGRESS NOTES
[facility]  Lymphedema Clinic  [address] [facility], [address], [facility] 22.    INITIAL EVALUATION    NAME: Joseph Hair AGE: 68 y.o. GENDER: female  DATE: 3/1/2022  REFERRING PHYSICIAN: Dr. Coats Yvan:   Primary Diagnosis:  ?  B LE lymphedema, secondary (I89.0)  Other Treatment Diagnoses:  ? Other abnormalities of gait and mobility (R26.89)  ? Swelling not relieved by elevation (R60.9)  ? 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 Patient's current stockings are due for replacement and she reports they are no longer staying up on her right thigh. She is currently wearing 30-40mmHg Sig Varis. She reports she is walking and, wears her compression daily but she is not performing her exercises. She has questions about prioritizing her home program activities for optimal benefit when she has limited time. Mami Martin is present today and she assists her at home. Right lower extremity swelling was first noted in November of 2018. Patient was diagnosed with endometrial cancer with a tumor in the right hip. She underwent a hip replacement in July of 2018, radiation treatments in September of 2018 and chemotherapy.      Lymphedema Life Impact Scale (LLIS): scores 33 out of 68 with 49% impairment   Past Medical History:   Past Medical History:   Diagnosis Date    Endometrial cancer ([address]     mets to right hip and lungs     Past Surgical History:   Procedure Laterality Date    ABDOMEN SURGERY PROC UNLISTED      HX APPENDECTOMY  1975    HX HEENT  2006    OS Cataract    HX HIP REPLACEMENT Right      Current Medications: Current Outpatient Medications   Medication Sig    anastrozole (Arimidex) 1 mg tablet Take 1 mg by mouth daily.  multivit-min-FA-lycopen-lutein (Centrum Silver) 0.4-300-250 mg-mcg-mcg tab Take 1 Tab by mouth daily.  calcium-vitamin D (OS-ISIDRO +D3) 500 mg-200 unit per tablet Take 1 Tab by mouth daily. No current facility-administered medications for this encounter. 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 She enjoys gardening. She is responsible for managing the finances at the [address] [place] [address] Drive. Patient's caregiver, Sister Mimi Pelayo is present with her today and she helps her don her stockings and with any care she needs. Living Situation: Resides in a [facility] Caregiver?: Yes, Sister Mimi Pelayo is present today  Mobility: Independent gait without any assistive device for community distances but with decreased step length on right lower extremity  Sleeping Arrangement:  in bed with legs elevated  Adaptive Equipment Owned:  Sleeps in hospital bed at night and elevates her leg  [employer] Addressed (if needed):  N/A  Other: Patient is able to assist with donning compression stockings and does have assistance. Sister Mimi Pelayo puts them on her lower legs for her and then patient pulls them up the rest of the way. Previous Therapy:  Patient has been treated at [facility] 4/4/19 to 5/21/19, 1/2/2020 and 1/25/2021 . Compression/Lymphedema Equipment: Patient is wearing bilateral lower extremity Sig varis 30-40mmHg thigh highs with closed toes and silicone band but stockings are no longer staying up on her right thigh. Patient has Solaris tribute with compression jacket.     SUBJECTIVE:   I have put on some weight and I think my right leg is more swollen. I have some questions about the home program activities and which ones I should really focus on when I am busy and time is limited. I am walking and I do the steps at the [facility]. I don't take the elevator. Patients goals for therapy: reassess swelling, try to obtain new compression stockings  OBJECTIVE DATA SUMMARY:   EXAMINATION/PRESENTATION/DECISION MAKING:     Pain: No complaints of pain currently but she does report intermittent right hip pain that she describes as sharp and often occurs with sit to stand activities. Pain Scale 1: Numeric (0 - 10)  Pain Intensity 1: 0  Pain Location 1: Leg  Patient Stated Pain Goal: 0    Self-Care and ADLs: Modified independent but she does have difficulty reaching her feet so Sister Ashli Rodriguez initiates stocking donning for her to the knee. Skin and Tissue Assessment:  Dermal Status: Intact, Dry and Flaky    Texture/Consistency: Boggy right knee and thigh    Pigmentation/Color Change: Hyperpigmented right leg    Anomalies: N/A    Circulatory: Vascular studies ruled out DVT in past    Nails: Normal    Stemmers Sign:  negative    Height:  Height: 5' 1\" (154.9 cm)  Weight:  Weight: 66 kg (145 lb 6.4 oz) Was 140lbs 9.6oz last assessment    BMI:  BMI (calculated): 27.5  (36 or greater: adversely affecting lymphedema)  Wound/Ulcer:  N/A        Volumetric Measurements:   Right: 7,705.99mL  (increased by 872. 15since last assessment 1/25/2021) Left:  7,371.39 mL (increased by 286.6 ml since last assessment 1/25/2021)   % Difference: right leg is 4.54% larger than the left versus 3.54% smaller than the left on 1/25/2021 Dominance: right   (See scanned graph)  Right leg limb volume is increased with changes noted from knee to hip     Range of Motion: [facility] for bilateral lower extremities  Strength: Not formally assessed 2* patient is able to complete all functional activities in the clinic today.   Sensation:  Intact     Mobility:    Bed/Chair Mobility: Modified independent to minimum assistance for supine to sit  Transfers: Modified independent  Gait: Independent  Endurance: good     Safety:  Patient is alert and oriented: yes  Safety awareness: good  Fall risk?: moderate with altered gait pattern  Patient given written fall prevention handout: Yes    Based on the above components, the patient evaluation is determined to be of the following complexity level: LOW     Evaluation Time: [id number]  30 minutes    TREATMENT PROVIDED:   1. Treatment description:  Therapeutic Exercise and Procedure:   Reviewed Clau Bame exercises in supine with patient redemonstration independent following the home program handout. Encouraged patient to focus on daily compression garment wear and daily Karol ball exercises. She has a copy of the handout for home use which she brought in with her today. She was encouraged to walk for exercise as well and she reports that she has goes up and down the the steps frequently for exercise. She does report that she sometimes sits for extended periods of time and she is encouraged to get up from her chair and walk/move around every hour. Treatment time:  [id number]  Minutes: 10    2. Treatment description:  Manual Therapy:  Reviewed the basics of self manual lymph drainage techniques and discussed performance 3-4 days a week. Per patient questions about home program, discussed focusing on daily compression garment wear and daily exercise with self manual lymph drainage when time permits. Assessed current compression garments which are overdue for replacement and patient reports she has not been wearing her shapewear with the thigh high compression garments. Reviewed garment lifespan of 4-5 months. Reviewed results of reassessment and the increased swelling in the right leg is probably due to inadequate compression from her current compression garments.   Therapist was able to locate 3 donated pairs of Jobst relief thigh highs in black, size small 20-30mmHG that fit patient very well and provide good coverage over the proximal thigh. Reviewed donning and doffing and tissue positioning inside the garment to improve suspension on the thigh. Increased swelling is noted in proximal right thigh and patient has not been consistently wearing a compression bike short and current short is very worn. Therapist located 2 pairs of donated Nike sports compression bike shorts in size medium. Donned each garment with good fit noted and good overlap in the right thigh to provide extra compression in the proximal thigh and to help with stocking suspension. Therapist redonned bilateral lower extremity Jobst Relief thigh high stockings and assisted with Nike sports compression bike short donning for patient to wear home . Discussed re-evaluations and due to the increase in proximal right thigh swelling, patient will return to therapy in 6 months for a re-evaluation. Discussed with them that they may opt to extend that re-evaluation to 1 year, if patient is doing better and managing well at the time of the 6 month re-evaluation. Treatment time:  9449-2869 Minutes: 35  Pain Level: 0/10 numeric scale  ASSESSMENT:   Timothy Savage is a 68 y.o. female who presents with right lower extremity, stage 2  lymphedema secondary to a history of endometrial cancer with metastasis to the right hip resulting in a right hip replacement. Swelling is primarily present proximally from right trunk to the knee. Patient's right lower extremity limb volume is increased today,but her right lower extremity compression stocking is not staying up on her right thigh and it is overdue for replacement. Therapist was able to locate donated thigh highs that fit patient today and she was provided with products for home use. Reviewed her home management program as well. Patient is motivated to improve her condition. No additional treatment visits are required at this time.    Patient continues to benefit from complete decongestive therapy (CDT) including: Manual lymphatic drainage (MLD) technique and Short-stretch textile bandages/compression system to decongest limb as appropriate. This care is medically necessary due to the infection risk with lymphedema and to improve functional activities. CDT is necessary to resolve swelling to allow patient to return to wearing normal clothes/footwear and prevent worsening of symptoms, such as infections and/or hospitalizations. Patient will be independent with home program strategies to allow improved ADL ability and mobility and to allow patient to return to greatest functional independence. Rehabilitation potential is considered to be Good. Factors which may influence rehabilitation potential include excellent support from [facility], but limited funds for purchasing compression products. Patient will benefit from a re-evaluation of her swelling status and compression product needs in 6 months. Sister Lurena Schilder and Sister Kristofer Hayes are to call if patient has any needs prior to that follow-up visit. PLAN OF CARE:   Recommendations and Planned Interventions: Manual lymph drainage/decongestive therapy, Compression garment fitting/provision, Lymphedema therapeutic exercise, Self-care training, Education in skin care and lymphedema precautions, Self-MLD education per home program and Caregiver education as needed    GOALS   Patient to continue with home lymphedema management program including daily compression garment wearing schedule, daily skin care, exercise, and self manual lymph drainage 3-4 times a week. Patient will return to the clinic in 6 months for re-evaluation, but they are to call if she has any needs prior to that time. Patient has participated in goal setting and agrees to work toward plan of care. Patient was instructed to call if questions or concerns arise.     Thank you for this referral.   Khang Olvera MSPT, CLT-MOLINA       Total timed codes: 45  1:1 Treatment time: 45  TREATMENT PLAN EFFECTIVE DATES:   3/1/2022 TO 3/1/2022  I have read the above plan of care for [facility]. I certify the above prescribed services are required by this patient and are medically necessary.   The above plan of care has been developed in conjunction with the lymphedema/physical therapist.       Physician Signature: ____________________________________Date:______________                                         Dr. Alysa Singh

## 2022-03-18 PROBLEM — M84.40XA PATHOLOGIC FRACTURE: Status: ACTIVE | Noted: 2018-07-13

## 2022-03-19 PROBLEM — M84.451A PATHOLOGICAL FRACTURE OF RIGHT HIP (HCC): Status: ACTIVE | Noted: 2018-07-13

## 2022-03-20 PROBLEM — J12.9 VIRAL PNEUMONIA: Status: ACTIVE | Noted: 2020-11-16

## 2022-03-20 PROBLEM — C54.1 ENDOMETRIAL CANCER (HCC): Status: ACTIVE | Noted: 2018-08-08

## 2022-09-06 ENCOUNTER — HOSPITAL ENCOUNTER (OUTPATIENT)
Dept: PHYSICAL THERAPY | Age: 77
Discharge: HOME | End: 2022-09-06
Attending: EMERGENCY MEDICINE
Payer: MEDICARE

## 2022-09-06 VITALS — BODY MASS INDEX: 26.3 KG/M2 | HEIGHT: 61 IN | WEIGHT: 139.3 LBS

## 2022-09-06 PROCEDURE — 97140 MANUAL THERAPY 1/> REGIONS: CPT

## 2022-09-06 PROCEDURE — 97161 PT EVAL LOW COMPLEX 20 MIN: CPT

## 2022-09-06 NOTE — PROGRESS NOTES
Outpatient Lymphedema Clinic  a part of [address] [place] [address], [facility]. [address]  Phone: [phone]  Fax: [phone]    Plan of Care/Statement of Necessity for Physical Therapy/Occupational Therapy Services  2-15    Patient name: Edgar Marin  : 1945  Provider#: [id number]  Referral source:  Bev Crawford     Medical/Treatment Diagnosis: Lymphedema, not elsewhere classified [I89.0]     Prior Hospitalization: see medical history     Comorbidities:   Endometrial [address] ([facility] [address]        mets to right hip and lungs            Past Surgical History:   Procedure Laterality Date    ABDOMEN SURGERY PROC UNLISTED        HX APPENDECTOMY       HX HEENT        OS Cataract    HX HIP REPLACEMENT       Prior Level of Function: Independent gait without any assistive device for community distances but with decreased step length on right lower extremity, modified independent with self care and minimum assist with garment donning  Medications: Verified on Patient Summary List  Start of Care: 2022      Onset Date:  19 Patient returns for re-evaluation today   The Plan of Care and following information is based on the information from the initial evaluation. Assessment/ key information:    Edgar Marin is a 68 y.o. female who presents with right lower extremity, stage 2  lymphedema secondary to a history of endometrial cancer with metastasis to the right hip resulting in a right hip replacement. Swelling is primarily present proximally from right trunk to the knee. Patient's bilateral lower extremity limb volumes are decreased today with the greatest decrease noted in the affected right leg. She does not currently require new compression garments but she was provided with shapewear to wear with her thigh highs. Reviewed her home management program as well. Patient is motivated to improve her condition.  No additional treatment visits are required at this time. Patient continues to benefit from complete decongestive therapy (CDT) including: Manual lymphatic drainage (MLD) technique and Short-stretch textile bandages/compression system to decongest limb as appropriate. This care is medically necessary due to the infection risk with lymphedema and to improve functional activities. CDT is necessary to resolve swelling to allow patient to return to wearing normal clothes/footwear and prevent worsening of symptoms, such as infections and/or hospitalizations. Patient will be independent with home program strategies to allow improved ADL ability and mobility and to allow patient to return to greatest functional independence.     Evaluation Complexity History MEDIUM  Complexity : 1-2 comorbidities / personal factors will impact the outcome/ POC ; Examination MEDIUM Complexity : 3 Standardized tests and measures addressing body structure, function, activity limitation and / or participation in recreation  ;Presentation LOW Complexity : Stable, uncomplicated  ;Clinical Decision Making Other outcome measures LLIS scores 25% impairment  LOW   Overall Complexity Rating: LOW     Problem List: edema affecting function, patient requires regular replacement of compression garments to prevent worsening of swelling which would impair functional mobility   Treatment Plan may include any combination of the following: Therapeutic exercise, Manual therapy, Patient education, and Other: measure and fit for compression garments  Patient / Family readiness to learn indicated by: asking questions, trying to perform skills, and interest  Persons(s) to be included in education: patient (P) and family support person (FSP);list Sister  Barriers to Learning/Limitations: yes;  financial for obtaining compression garments  Patient Goal (s): to get better  Patient Self Reported Health Status: not assessed  Rehabilitation Potential: good     Goals  Patient to continue with home lymphedema management program including daily compression garment wearing schedule, daily skin care, exercise, and self manual lymph drainage 3-4 times a week. Patient will return to the clinic in 6 months for re-evaluation, but they are to call if she has any needs prior to that time. Frequency / Duration:   Patient will benefit from a re-evaluation of her swelling status and compression product needs in 6 months. Sister Magaly Cantu and Sister Brandie Watts are to call if patient has any needs prior to that follow-up visit. Patient/ Caregiver education and instruction: self care and other compression garment use, skin care, exercise, manual lymph drainage    []  Plan of care has been reviewed with PTA        Certification Period: 9/6/2022-9/6/2022     ADDISON Roblero, CHARLOTTE-MOLINA 9/6/2022     ________________________________________________________________________    I certify that the above Therapy Services are being furnished while the patient is under my care. I agree with the treatment plan and certify that this therapy is necessary. Physician's Signature:____________________  Date:____________Time: _________          Denny Monge

## 2022-09-06 NOTE — PROGRESS NOTES
[facility]  Lymphedema Clinic  [address], [address], [address]    INITIAL EVALUATION    NAME: Fredy Segovia AGE: 68 y.o. GENDER: female  DATE: 9/6/2022  REFERRING PHYSICIAN: Dr. 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 Patient is currently wearing 30-40mmHg size small Jobst relief stockings and she reports having another pair at home. She reports she wears her compression daily and she is now using a recumbent bike regularly that has helped with her mobility. Sister Riya Rashid is present today and she assists her at home. Right lower extremity swelling was first noted in November of 2018. Patient was diagnosed with endometrial cancer with a tumor in the right hip. She underwent a hip replacement in July of 2018, radiation treatments in September of 2018 and chemotherapy. Lymphedema Life Impact Scale (LLIS): scores 17 out of 68 with  25% impairment   Past Medical History:   Past Medical History:   Diagnosis Date    Endometrial cancer ([facility] [address]     mets to right hip and lungs     Past Surgical History:   Procedure Laterality Date    ABDOMEN SURGERY PROC UNLISTED      HX APPENDECTOMY  1975    HX HEENT  2006    OS Cataract    HX HIP REPLACEMENT Right      Current Medications:    Current Outpatient Medications   Medication Sig    anastrozole (Arimidex) 1 mg tablet Take 1 mg by mouth daily.     multivit-min-FA-lycopen-lutein (Centrum Silver) 0.4-300-250 mg-mcg-mcg tab Take 1 Tab by mouth daily. calcium-vitamin D (OS-ISIDRO +D3) 500 mg-200 unit per tablet Take 1 Tab by mouth daily. No current facility-administered medications for this encounter. Allergies: Allergies   Allergen Reactions    Aleve [Naproxen Sodium] Nausea Only        Prior Level of Function/Social/Work History/Personal factors and/or comorbidities impacting plan of care: Sister Srinath dominique is a [address] who resides at a [address] in [place], [place]. She enjoys gardening. She is responsible for managing the finances at the [address] [place] [address] Drive. Patient's caregiver, Sister Chapis Batista is present with her today and she helps her don her stockings and with any care she needs. Living Situation: Resides in a [facility] Caregiver?: Yes, Sister Chapis Batista is present today  Mobility: Independent gait without any assistive device for community distances but with decreased step length on right lower extremity  Sleeping Arrangement:  in bed with legs elevated  Adaptive Equipment Owned:  Sleeps in hospital bed at night and elevates her leg  [employer] Addressed (if needed):  N/A  Other: Patient is able to assist with donning compression stockings and does have assistance. Sister Chapis Batista puts them on her lower legs for her and then patient pulls them up the rest of the way. Previous Therapy:  Patient has been treated at [address] [place] [address] Lymphedema Program 4/4/19 to 5/21/19, 1/2/2020 and 1/25/2021 . Compression/Lymphedema Equipment: Patient is wearing bilateral lower extremity Jobst relief small petite thigh highs 30-40mmHg with closed toes and silicone band. She also wears Sig varis 30-40mmHg thigh highs with closed toes. Patient has Solaris tribute with compression jacket.  Patient also wears shape wear shorts to provided additional compression and help with stocking suspension    SUBJECTIVE:    Sister Chapis Batista reports that Sister Aishwarya's family bought her a recumbent bike that she is using regularly and they are noticing improved mobility with activities like the stairs. Patient can now do some step over step pattern on the stairs. Patients goals for therapy: to get better  OBJECTIVE DATA SUMMARY:   EXAMINATION/PRESENTATION/DECISION MAKING:     Pain: No complaints of pain currently   Pain Scale 1: Numeric (0 - 10)  Pain Intensity 1: 0  Pain Location 1: Leg  Patient Stated Pain Goal: 0    Self-Care and ADLs: Modified independent but she does have difficulty reaching her feet so Sister Mike Foster initiates stocking donning for her to the knee. Skin and Tissue Assessment:  Dermal Status: Intact, Dry and Flaky    Texture/Consistency: Boggy right knee and thigh    Pigmentation/Color Change: Hyperpigmented right leg    Anomalies: N/A    Circulatory: Vascular studies ruled out DVT in past    Nails: Normal    Stemmers Sign:  negative    Height:  Height: 5' 1\" (154.9 cm)  Weight:  Weight: 63.2 kg (139 lb 4.8 oz) Was 140lbs 9.6oz last assessment    BMI:  BMI (calculated): 26.3  (36 or greater: adversely affecting lymphedema)  Wound/Ulcer:  N/A        Volumetric Measurements:   Right: 6,833.84mL  (decreased by 943. 04ml since last assessment 3/1/2022) Left:6,801.44mL (decreased by 569.95ml since last assessment 3/1/2022)   % Difference: right leg is 0.57 % smaller than the left versus 4.54% larger than the left on 3/1/2022 Dominance: right   (See scanned graph)    Range of Motion: [facility] for bilateral lower extremities  Strength: Not formally assessed 2* patient is able to complete all functional activities in the clinic today. Sensation:  Intact     Mobility:    Bed/Chair Mobility: Modified independent to minimum assistance for supine to sit  Transfers: Modified independent  Gait: Independent  Endurance: good     Safety:  Patient is alert and oriented: yes  Safety awareness: good  Fall risk?: moderate with altered gait pattern     Evaluation Time: 5856-6024 minutes    TREATMENT PROVIDED:   1.   Treatment description:  Therapeutic Exercise and Procedure:   Discussed home exercise program and patient is using a recumbent bike regularly with noticeable improvement in overall mobility. Recommended she incorporate deep abdominal breathing into her exercises as well. Patient is independent with deep abdominal breathing techniques. Treatment time:  0877-3122 Minutes:  5    2. Treatment description:  Manual Therapy: Patient redemonstrated basic self manual lymph drainage techniques independent. Assessed current compression garments which are in good condition and patient reports she has loves the shapewear garments that she wears with them. Patient received 3 donated pairs of stockings when she was last seen in therapy and she reports she has a second set of stockings at home to wear currently. No new garments are required currently. She is wearing them daily. Therapist located 2 pairs of donated compression bike short shapewear and donated to patient today. Therapist redonned bilateral lower extremity Jobst Relief thigh high stockings post treatment and reviewed technique with patient and caregiver. Located a pair of small thigh highs to donate to patient but they are beige and patient only wears black with her habit. She declined them today. Discussed the possibility of obtaining products with the [address] when she returns to therapy for reassessment in 6 months. Reviewed the results of reassessment today with decreases noted bilaterally. Encouraged continued performance of her home program.  Discussed skin care at length today as patient does not apply lotion to her legs. Discussed why lotion is recommended and the elevated infection risk with lymphedema. Explained the science behind the recommendations. Discussed lotion options at length and provided them with an updated list of lotion options to achieve a pH of 5.4 on the skin.  Discussed that patient may want to try Cerave and discussed the option of using the generic neyda to decrease the expense. Discussed how to assess lotions for appropriateness and pH level. Reviewed the signs and symptoms of cellulitis with patient and Sister Cindy Piña. Discussed re-evaluation and patient will return to therapy in 6 months for a re-evaluation     Treatment time:  5948-3997 Minutes: 55  Pain Level: 0/10 numeric scale  ASSESSMENT:   Haylee Poon is a 68 y.o. female who presents with right lower extremity, stage 2  lymphedema secondary to a history of endometrial cancer with metastasis to the right hip resulting in a right hip replacement. Swelling is primarily present proximally from right trunk to the knee. Patient's bilateral lower extremity limb volumes are decreased today with the greatest decrease noted in the affected right leg. Patient does not currently require new compression garments but she was provided with shapewear to wear with her thigh highs. Reviewed her home management program as well. Patient is motivated to improve her condition. No additional treatment visits are required at this time. Patient continues to benefit from complete decongestive therapy (CDT) including: Manual lymphatic drainage (MLD) technique and Short-stretch textile bandages/compression system to decongest limb as appropriate. This care is medically necessary due to the infection risk with lymphedema and to improve functional activities. CDT is necessary to resolve swelling to allow patient to return to wearing normal clothes/footwear and prevent worsening of symptoms, such as infections and/or hospitalizations. Patient will be independent with home program strategies to allow improved ADL ability and mobility and to allow patient to return to greatest functional independence. Rehabilitation potential is considered to be Good. Factors which may influence rehabilitation potential include excellent support from [facility], but limited funds for purchasing compression products.   Patient will benefit from a re-evaluation of her swelling status and compression product needs in 6 months. Sister Marcelino Luna and Sister Ute Urrutia are to call if patient has any needs prior to that follow-up visit. PLAN OF CARE:   Recommendations and Planned Interventions: Manual lymph drainage/decongestive therapy, Compression garment fitting/provision, Lymphedema therapeutic exercise, Self-care training, Education in skin care and lymphedema precautions, Self-MLD education per home program, and Caregiver education as needed    GOALS   Patient to continue with home lymphedema management program including daily compression garment wearing schedule, daily skin care, exercise, and self manual lymph drainage 3-4 times a week. Patient will return to the clinic in 6 months for re-evaluation, but they are to call if she has any needs prior to that time. Patient has participated in goal setting and agrees to work toward plan of care. Patient was instructed to call if questions or concerns arise.     Thank you for this referral.   ADDISON Corona, [place]       Total timed codes: 60  1:1 Treatment time: 61

## 2023-03-07 ENCOUNTER — APPOINTMENT (OUTPATIENT)
Dept: PHYSICAL THERAPY | Age: 78
End: 2023-03-07
Attending: EMERGENCY MEDICINE

## 2023-03-07 NOTE — PROGRESS NOTES
Outpatient Lymphedema Clinic  a part of [address] [place] [address], [address] [facility], [address] [place] [address]  Phone: [phone]  Fax: [phone]     Plan of Care/Statement of Necessity for Physical Therapy/Occupational Therapy Services  2-15     Patient name: Brisa Gamez                   : 1945                          Provider#: [id number]  Referral source:  Satish Recinos                              Medical/Treatment Diagnosis: Lymphedema, not elsewhere classified [I89.0]                                 Prior Hospitalization: see medical history                                      Comorbidities: history of endometrial cancer with mets to right hip and lungs, right hip replacement  Prior Level of Function: Independent gait without any assistive device for community distances but with decreased step length on right lower extremity, modified independent with self care and minimum assist with garment donning  Medications: Verified on Patient Summary List  Start of Care: 3/7/2023                                                               Onset Date:  19 Patient returns for re-evaluation today       The Plan of Care and following information is based on the information from the initial evaluation. Assessment/ wade information:    Brisa Gamez is a 68 y.o. female who presents with right lower extremity, stage 2  lymphedema secondary to a history of endometrial cancer with metastasis to the right hip resulting in a right hip replacement. Swelling is primarily present proximally from right trunk to the knee. Patient's bilateral lower extremity limb volumes are decreased today with the greatest decrease noted in the affected right leg. She does not currently require new compression garments but she was provided with shapewear to wear with her thigh highs. Reviewed her home management program as well. Patient is motivated to improve her condition.  No additional treatment visits are required at this time. Patient continues to benefit from complete decongestive therapy (CDT) including: Manual lymphatic drainage (MLD) technique and Short-stretch textile bandages/compression system to decongest limb as appropriate. This care is medically necessary due to the infection risk with lymphedema and to improve functional activities. CDT is necessary to resolve swelling to allow patient to return to wearing normal clothes/footwear and prevent worsening of symptoms, such as infections and/or hospitalizations. Patient will be independent with home program strategies to allow improved ADL ability and mobility and to allow patient to return to greatest functional independence.      Evaluation Complexity History MEDIUM  Complexity : 1-2 comorbidities / personal factors will impact the outcome/ POC ; Examination MEDIUM Complexity : 3 Standardized tests and measures addressing body structure, function, activity limitation and / or participation in recreation  ;Presentation LOW Complexity : Stable, uncomplicated  ;Clinical Decision Making Other outcome measures LLIS scores  % impairment  LOW   Overall Complexity Rating: LOW      Problem List: edema affecting function, patient requires regular replacement of compression garments to prevent worsening of swelling which would impair functional mobility              Treatment Plan may include any combination of the following: Therapeutic exercise, Manual therapy, Patient education, and Other: measure and fit for compression garments  Patient / Family readiness to learn indicated by: asking questions, trying to perform skills, and interest  Persons(s) to be included in education: patient (P) and family support person (FSP);list Sister  Barriers to Learning/Limitations: yes;  financial for obtaining compression garments as insurance does not currently provide coverage for compression products  Patient Goal (s): to get better  Rehabilitation Potential: good      Goals  Patient to continue with home lymphedema management program including daily compression garment wearing schedule, daily skin care, exercise, and self manual lymph drainage 3-4 times a week. Patient will return to the clinic in 6 months for re-evaluation, but they are to call if she has any needs prior to that time. Frequency / Duration:   Patient will benefit from a re-evaluation of her swelling status and compression product needs in 6 months. Sister Sukhi Traore and Sister Sil Puri are to call if patient has any needs prior to that follow-up visit. Patient/ Caregiver education and instruction: self care and other compression garment use, skin care, exercise, manual lymph drainage     []  Plan of care has been reviewed with PTA          Certification Period: 3/7/2023-6/2/2023      ADDISON Moreno, [place]-MOLINA  3/7/2023     ________________________________________________________________________     I certify that the above Therapy Services are being furnished while the patient is under my care. I agree with the treatment plan and certify that this therapy is necessary. Physician's Signature:____________________  Date:____________Time: _________                                           Sujit Mathews

## 2023-03-07 NOTE — PROGRESS NOTES
PT/OT LYMPHEDEMA INITIAL EVALUATION NOTE - [facility] 2-15    Patient Name: Alvino Dao  Date:3/7/2023  : 1945  [x]  Patient  Verified  Payor: [facility] MEDICARE COMPLETE / Plan: [facility] MEDICARE COMPLETE / Product Type: Managed Care Medicare /    In time: ***  Out time: ***  Total Treatment Time (min): ***  Total Timed Codes (min): ***  1:1 Treatment Time ( only): ***   Visit #: 1    Treatment Area: Lymphedema, not elsewhere classified [I89.0]    SUBJECTIVE  Pain Level (0-10 scale): out of 10 numeric scale  Any medication changes, allergies to medications, adverse drug reactions, diagnosis change, or new procedure performed?: [] No    [x] Yes (see summary sheet for update)  Subjective:    ***  Prior Level of Function: Independent gait without any assistive device for community distances but with decreased step length on right lower extremity  Mechanism of Injury: Patient returns for reassessment of right lower extremity lymphedema with no complaints. Patient is currently wearing 30-40mmHg size small Jobst relief stockings and she reports having another pair at home. She reports she wears her compression daily and she is now using a recumbent bike regularly that has helped with her mobility. Sister Heather Tucker is present today and she assists her at home. Right lower extremity swelling was first noted in 2018. Patient was diagnosed with endometrial cancer with a tumor in the right hip. She underwent a hip replacement in 2018, radiation treatments in 2018 and chemotherapy.    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 She enjoys gardening. 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 Patient's caregiver, Sister Samia Molina is present with her today and she helps her don her stockings and with any care she needs. Living Situation:  Resides in a [employer] with assistance from caregiver Sister Samia Molina  Pt Goals: to get better  Barriers: financial as her insurance does not currently provide coverage for the cost of compression garments  Motivation: patient is motivated to manage swelling   Substance use: none noted  Cognition: A & O x 4     Sleeping Arrangement:  Sleeps in hospital bed at night and elevates her leg  Compression/Lymphedema Equipment:  Patient is wearing bilateral lower extremity Jobst relief small petite thigh highs 30-40mmHg with closed toes and silicone band. She also wears Sig varis 30-40mmHg thigh highs with closed toes. Patient has Solaris tribute with compression jacket.  Patient also wears shape wear shorts to provided additional compression and help with stocking suspension       LLIS Score: scores out of  68 with % impairment    OBJECTIVE/EXAMINATION    Skin and Tissue Assessment:  Dermal Status: Intact, Dry, and Flaky    Texture/Consistency: Boggy  right knee and thigh    Pigmentation/Color Change: Hyperpigmented right leg    Anomalies:  none noted    Circulatory: Vascular studies ruled out DVT in past    Nails: Normal    Stemmers Sign: Negative    Height:     Weight:      BMI:     (36 or greater: adversely affecting lymphedema)  Volumetric Measurements:   Right:  ***mL (creased by ml since last assessment 9/6/2023) Left:  ***mL (creased by ml since last assessment 9/6/2023)   % Difference: right leg % smaller than left versus right leg  0.57 % smaller than the left on 9/6/2022 Dominance: right     *** min Therapeutic Exercise:  [] Rosalene Grooms Exercises [] Remedial Lymphedema Exercise Program [] Axillary Web Exercise Program      [] Shoulder ROM Exercises  Patient is using a recumbent bike regularly and she has incorporated deep abdominal breathing into her exercises as well. Patient is independent with deep abdominal breathing techniques. Rationale: Activate muscle pump to improve lymphatic fluid movement and decrease swelling to improve the patients ability to perform ADL and IADL skills and prevent worsening of swelling over time. *** min Manual Therapy: Reviewed results of assessment today. Reviewed home program.     Skin/wound care/debridement: Reviewed skin care principles:   Skin care products  Hygiene  Prevention of cellulitis  Wound care  Lotion is being applied following manual lymph drainage principles      Area to decongest: LE: right   Sequence used and effectiveness: Secondary sequence for lower extremities with trunk involvement.     Upper/Lower Extremity Compression: {OP lymphedma fitted v. Measured:42825} the following compression products:    LE: bilateral bilateral lower extremity: Thigh high Bike short    Style: Circular knit    Brand: {OP lymphedema brand compression garments:77842}    Type: Non-Custom: Size: ***    Vendor: {OP Lymphedema compression vendor:[id number]}    Education: {OP lymphedema compression education 2:55346}    Patient/family demonstrated donning and doffing with {OP lymphedema compression education:89193}     Sister Micael Dance is assisting with garment donning each morning and they are independent with product use     Rationale:  to decrease swelling and pain to improve the patients ability to manage swelling and prevent worsening over time             With   [] TE   [] TA   [x] MT   [] SC   [x] other: Patient Education: [x] Review HEP , self lymphedema management techniques  [x] MLD Patient Education   [] Progressed/Changed HEP based on:   [] positioning   [] Kinesiotape   [x] Skin care   [] wound care   [x] other: compression garments   role of compression, role of vasopneumatic device  []   Patient / caregiver re-demonstrated bandaging.  [] Yes  [] No  Compression bandaging/garment precautions reviewed: [] Yes  [] No     Other Objective/Functional Measures:     Function: Independent gait without any assistive device for community distances but with decreased step length on right lower extremity         Pain Level (0-10 scale) post treatment: out of 10 numeric scale    ASSESSMENT/Changes in Function:       [x]  See Plan of [address] [facility], ADDISON, CHARLOT[place]-MOLINA  3/7/2023

## 2023-04-18 ENCOUNTER — HOSPITAL ENCOUNTER (OUTPATIENT)
Dept: PHYSICAL THERAPY | Age: 78
Discharge: HOME | End: 2023-04-18
Attending: EMERGENCY MEDICINE
Payer: MEDICARE

## 2023-04-18 VITALS — WEIGHT: 139.2 LBS | HEIGHT: 61 IN | BODY MASS INDEX: 26.28 KG/M2

## 2023-04-18 PROCEDURE — 97535 SELF CARE MNGMENT TRAINING: CPT

## 2023-04-18 PROCEDURE — 97760 ORTHOTIC MGMT&TRAING 1ST ENC: CPT

## 2023-04-18 PROCEDURE — 97161 PT EVAL LOW COMPLEX 20 MIN: CPT

## 2023-04-18 NOTE — THERAPY EVALUATION
Outpatient [facility]ymphedema Clinic  a part of [address] [place] [address] Renee [place], [address]  Phone: [phone]  Fax: [phone]     Plan of Care/Statement of Necessity for Physical Therapy/Occupational Therapy Services  2-15     Patient name: Royal Rios                   : 1945                          Provider#: [id number]  Referral source:  [facility]                              Medical/Treatment Diagnosis: Lymphedema, not elsewhere classified [I89.0]                                 Prior Hospitalization: see medical history                                      Comorbidities:   Endometrial cancer ([facility] [address]         mets to right hip and lungs                Past Surgical History:   Procedure Laterality Date    ABDOMEN SURGERY PROC UNLISTED        HX APPENDECTOMY       HX HEENT        OS Cataract    HX HIP REPLACEMENT          Prior Level of Function: Independent gait without any assistive device for community distances but with decreased step length on right lower extremity, modified independent with self care and minimum assist with garment donning  Medications: Verified on Patient Summary List  Start of Care:  2023                                                              Onset Date:  19 Patient returns for re-evaluation today     The Plan of Care and following information is based on the information from the initial evaluation. Assessment/ key information:    Royal Rios is a 68 y.o. female who presents with right lower extremity, stage 2  lymphedema secondary to a history of endometrial cancer with metastasis to the right hip resulting in a right hip replacement. Swelling is primarily present proximally from right trunk to the knee.   Patient's lower extremity limb volumes are increased bilaterally today but the right leg remains smaller than the left with the percentage difference between the limbs remaining essentially unchanged. She was able to fit into some donated garments today and has adequate compression. Reviewed her home management program as well. Patient is motivated to improve her condition. No additional treatment visits are required at this time. Patient continues to benefit from complete decongestive therapy (CDT) including: Manual lymphatic drainage (MLD) technique and Short-stretch textile bandages/compression system to decongest limb as appropriate. This care is medically necessary due to the infection risk with lymphedema and to improve functional activities. CDT is necessary to resolve swelling to allow patient to return to wearing normal clothes/footwear and prevent worsening of symptoms, such as infections and/or hospitalizations. Patient will be independent with home program strategies to allow improved ADL ability and mobility and to allow patient to return to greatest functional independence.      Evaluation Complexity History MEDIUM  Complexity : 1-2 comorbidities / personal factors will impact the outcome/ POC ; Examination MEDIUM Complexity : 3 Standardized tests and measures addressing body structure, function, activity limitation and / or participation in recreation  ;Presentation LOW Complexity : Stable, uncomplicated  ;Clinical Decision Making Other outcome measures LLIS scores  31% impairment medium  Overall Complexity Rating: LOW      Problem List: edema affecting function, patient requires regular replacement of compression garments to prevent worsening of swelling which would impair functional mobility              Treatment Plan may include any combination of the following: Therapeutic exercise, Manual therapy, Patient education, review of home lymphedema management program, measure and fit for compression garments  Patient / Family readiness to learn indicated by: asking questions, trying to perform skills, and interest  Persons(s) to be included in education: patient (P) and family support person (FSP);list Sister  Barriers to Learning/Limitations: yes;  financial for obtaining compression garments, insurance does not currently cover the cost of compression products  Patient Goal (s):  to let me know what I should do to take care of my leg\"  Rehabilitation Potential: good      Goals   Discharge to home to home restorative phase of treatment with daily wearing schedule of compression garments. Patient to continue with daily skin care, exercise, and compression garment use. Patient will return to therapy in 6 months for re-evaluation of swelling status and compression product needs. Frequency / Duration:   Patient will benefit from a re-evaluation of her swelling status and compression product needs in 6 months. Sister Lamine Alejandro and Sister Fredy Stearns are to call if patient has any needs prior to that follow-up visit. Patient/ Caregiver education and instruction: self care and other compression garment use, skin care, exercise, manual lymph drainage     []  Plan of care has been reviewed with PTA           Certification Period:  4/18/2023-4/18/2023      ADDISON Haas, MONTSERRAT  4/18/2023     ________________________________________________________________________     I certify that the above Therapy Services are being furnished while the patient is under my care. I agree with the treatment plan and certify that this therapy is necessary. Physician's Signature:____________________  Date:____________Time: _________                                           Gilma Nair

## 2023-04-18 NOTE — PROGRESS NOTES
PT/OT LYMPHEDEMA INITIAL EVALUATION NOTE - [facility] 2-15    Patient Name: Corine Barclay  FOVI:  : 1945  [x]  Patient  Verified  Payor: Franca  / Plan: [employer] A[employer] MEDICARE COMPLETE / Product Type: Managed Care Medicare /    In time: 1417  Out time: 1200  Total Treatment Time (min): 73  Total Timed Codes (min): 43  1:1 Treatment Time ( only): 37   Visit #: 1    Treatment Area: Lymphedema, not elsewhere classified [I89.0]    SUBJECTIVE  Pain Level (0-10 scale): 0 out of 10 numeric scale  Any medication changes, allergies to medications, adverse drug reactions, diagnosis change, or new procedure performed?: [] No    [x] Yes (see summary sheet for update)  Subjective:    Patient reports she does not exercise on recumbent bike often enough. Wears stockings daily which Sister Charlotte Mcmullen helps her to don. She reports does not like having to rely on others to help her. Prior Level of Function:  Independent gait without any assistive device for community distances but with decreased step length on right lower extremity,   Modified independent but she does have difficulty reaching her feet so Sister Charlotte Mcmullen initiates stocking donning for her to the knee. Mechanism of Injury:  Patient returns for reassessment of right lower extremity lymphedema with no complaints. She is currently wearing 30-40mmHg size small Jobst relief stockings and she reports having another pair at home. She reports she wears her compression daily and she is wearing the donated mild compression shorts as well. Sister Charlotte Mcmullen is present today and she assists her at home. Right lower extremity swelling was first noted in 2018. Patient was diagnosed with endometrial cancer with a tumor in the right hip.   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 Patient's caregiver, Sister Josefa Mota is present with her today and she helps her don her stockings and with any care she needs. Pt Goals: to let me know what I should do to take care of my leg  Barriers: limited finances for compression products, insurance does not currently cover the cost of products  Motivation: patient is motivated to manage swelling and remain mobile  Substance use: none noted  Cognition: A & O x 3      Sleeping Arrangement:  Sleeps in hospital bed at night and elevates her leg  Compression/Lymphedema Equipment:   Patient wears bilateral lower extremity Jobst relief small petite thigh highs 30-40mmHg with closed toes and silicone band. She also wears Sig varis 30-40mmHg thigh highs with closed toes. Patient has Solaris tribute with compression jacket.  Patient also wears shape wear shorts to provide additional compression and help with stocking suspension    LLIS Score: scores 20 out of 64 with 31% impairment    OBJECTIVE/EXAMINATION    Skin and Tissue Assessment:  Dermal Status: Intact    Texture/Consistency: Boggy right knee and thigh    Pigmentation/Color Change: Hyperpigmented right LE    Anomalies:  none noted    Circulatory: Pulses and Vascular studies ruled out DVT in past    Nails: Discolored    Stemmers Sign: Negative    Height:  Height: 5' 1\" (154.9 cm)  Weight:  Weight: 63.1 kg (139 lb 3.2 oz)   BMI:  BMI (calculated): 26.3  (36 or greater: adversely affecting lymphedema)  Volumetric Measurements:   Right:  7,074.18mL (increased by 272.74 since last assessment 9/6/2022) Left:  7,039.95mL (increased by 277.0 ml since last assessment 9/6/2022)   % Difference: right leg is 0.48 % smaller than the left versus  0.57% smaller than the left on last assessment 9/6/2022 Dominance: right         32 min Orthotics Management and Training  Upper/Lower Extremity Compression: Fitted for the following compression products:    LE: bilateral bilateral lower extremity: Thigh high Pantyhose    Style: Circular knit    Brand: Medi  allegro    Type: Non-Custom: Size: medium allegro thigh highs donated, donated custom medi plus pantyhose 30-40mmHg    Vendor:  all items were donated to patient    Education: Educated patient in compression garment donning and doffing. Glove use with donning. Daily wear schedule. Daily laundering. Garment lifespan. Fitted patient for 2 pairs of donated Medi pantyhose and one pair of Allegro thigh highs. .  Donned all garments to assess fit and educated in garment donning during the assessment . One of the donated custom medi pantyhose was to short for patient but the other 2 products fit well. Reviewed wearing schedule, donning and doffing and benefits of pantyhose use for better swelling control on proximal right thigh. Addressed Sister Adelaide's questions about donning and doffing pantyhose and she will assist patient to the knee at home and she will use her Glades Florencia to don each leg. Donated a set of donning gloves to them for home use as well. Patient demonstrated donning and doffing with independence when donning from calf area and Sister Ebenezer Knight is skilled in donning on the lower legs. Patient is able to doff garments to the ankles and Sister Ebenezer Knight removes them the rest of the way.        Rationale:  to obtain and educate in compression garment use to improve the patients ability to manage swelling and prevent worsening over time    11 min Self-Care/Home Management Training      Skin care    Reviewed skin care principles:   Skin care products  Hygiene  Prevention of cellulitis  Wound care    Discussed application of lotion following the pattern of manual lymph drainage. Reviewed signs and symptoms of cellulitis, increased risk of infection with lymphedema, and what to do if symptoms occur. Discussed the role of skin care to reduce infection risk. Discussed basic care for skin injuries to minimize infection risk. Rationale: To improve or maintain skin integrity in order to minimize risk of infection and skin injury,     With   [] TE   [] TA   [] MT   [x] SC   [x] other: Patient Education: [x] Review Home lymphedema management program    [] MLD Patient Education   [] Progressed/Changed HEP based on:   [] positioning   [] Kinesiotape   [x] Skin care   [] wound care   [x] other: compression garments, role of compression,  donning and doffing pantyhose, garment lifespan and wearing schedule  []   Patient / caregiver re-demonstrated bandaging. [] Yes  [] No  Compression bandaging/garment precautions reviewed: [] Yes  [] No     Other Objective/Functional Measures:     Function:Independent gait without any assistive device for community distances but with decreased step length on right lower extremity,   Modified independent but she does have difficulty reaching her feet so Sister Tejas Sheehan initiates stocking donning for her to the knee.             Pain Level (0-10 scale) post treatment: 0 out of 10 numeric scale    ASSESSMENT/Changes in Function:       [x]  See Plan of [facility], MONTSERRAT JAIME  4/18/2023

## 2023-10-31 ENCOUNTER — TELEPHONE (OUTPATIENT)
Age: 78
End: 2023-10-31

## 2023-10-31 NOTE — TELEPHONE ENCOUNTER
Called pt but unable to hear me due to her phone. The provider will be out of the office on 11/7. Will need to r/s appt.

## 2023-12-05 ENCOUNTER — OFFICE VISIT (OUTPATIENT)
Age: 78
End: 2023-12-05
Payer: COMMERCIAL

## 2023-12-05 VITALS
WEIGHT: 135.4 LBS | DIASTOLIC BLOOD PRESSURE: 70 MMHG | OXYGEN SATURATION: 96 % | TEMPERATURE: 97.7 F | BODY MASS INDEX: 25.58 KG/M2 | RESPIRATION RATE: 18 BRPM | SYSTOLIC BLOOD PRESSURE: 135 MMHG

## 2023-12-05 DIAGNOSIS — Z85.42 HISTORY OF ENDOMETRIAL CANCER: ICD-10-CM

## 2023-12-05 DIAGNOSIS — R53.83 FATIGUE, UNSPECIFIED TYPE: Primary | ICD-10-CM

## 2023-12-05 DIAGNOSIS — N39.46 MIXED STRESS AND URGE URINARY INCONTINENCE: ICD-10-CM

## 2023-12-05 DIAGNOSIS — R53.83 FATIGUE, UNSPECIFIED TYPE: ICD-10-CM

## 2023-12-05 PROBLEM — M84.40XA PATHOLOGIC FRACTURE: Status: RESOLVED | Noted: 2018-07-13 | Resolved: 2023-12-05

## 2023-12-05 PROBLEM — J12.9 VIRAL PNEUMONIA: Status: RESOLVED | Noted: 2020-11-16 | Resolved: 2023-12-05

## 2023-12-05 PROBLEM — M84.451A PATHOLOGICAL FRACTURE OF RIGHT HIP (HCC): Status: RESOLVED | Noted: 2018-07-13 | Resolved: 2023-12-05

## 2023-12-05 PROCEDURE — 1123F ACP DISCUSS/DSCN MKR DOCD: CPT | Performed by: PHYSICIAN ASSISTANT

## 2023-12-05 PROCEDURE — 0509F URINE INCON PLAN DOCD: CPT | Performed by: PHYSICIAN ASSISTANT

## 2023-12-05 PROCEDURE — 1090F PRES/ABSN URINE INCON ASSESS: CPT | Performed by: PHYSICIAN ASSISTANT

## 2023-12-05 PROCEDURE — G8400 PT W/DXA NO RESULTS DOC: HCPCS | Performed by: PHYSICIAN ASSISTANT

## 2023-12-05 PROCEDURE — G8419 CALC BMI OUT NRM PARAM NOF/U: HCPCS | Performed by: PHYSICIAN ASSISTANT

## 2023-12-05 PROCEDURE — G8484 FLU IMMUNIZE NO ADMIN: HCPCS | Performed by: PHYSICIAN ASSISTANT

## 2023-12-05 PROCEDURE — 99214 OFFICE O/P EST MOD 30 MIN: CPT | Performed by: PHYSICIAN ASSISTANT

## 2023-12-05 PROCEDURE — G8427 DOCREV CUR MEDS BY ELIG CLIN: HCPCS | Performed by: PHYSICIAN ASSISTANT

## 2023-12-05 PROCEDURE — 1036F TOBACCO NON-USER: CPT | Performed by: PHYSICIAN ASSISTANT

## 2023-12-05 SDOH — ECONOMIC STABILITY: HOUSING INSECURITY
IN THE LAST 12 MONTHS, WAS THERE A TIME WHEN YOU DID NOT HAVE A STEADY PLACE TO SLEEP OR SLEPT IN A SHELTER (INCLUDING NOW)?: PATIENT REFUSED

## 2023-12-05 SDOH — ECONOMIC STABILITY: FOOD INSECURITY: WITHIN THE PAST 12 MONTHS, YOU WORRIED THAT YOUR FOOD WOULD RUN OUT BEFORE YOU GOT MONEY TO BUY MORE.: PATIENT DECLINED

## 2023-12-05 SDOH — ECONOMIC STABILITY: FOOD INSECURITY: WITHIN THE PAST 12 MONTHS, THE FOOD YOU BOUGHT JUST DIDN'T LAST AND YOU DIDN'T HAVE MONEY TO GET MORE.: PATIENT DECLINED

## 2023-12-05 SDOH — ECONOMIC STABILITY: INCOME INSECURITY: HOW HARD IS IT FOR YOU TO PAY FOR THE VERY BASICS LIKE FOOD, HOUSING, MEDICAL CARE, AND HEATING?: PATIENT DECLINED

## 2023-12-05 ASSESSMENT — PATIENT HEALTH QUESTIONNAIRE - PHQ9
SUM OF ALL RESPONSES TO PHQ QUESTIONS 1-9: 0
2. FEELING DOWN, DEPRESSED OR HOPELESS: 0
1. LITTLE INTEREST OR PLEASURE IN DOING THINGS: 0
SUM OF ALL RESPONSES TO PHQ9 QUESTIONS 1 & 2: 0
SUM OF ALL RESPONSES TO PHQ QUESTIONS 1-9: 0

## 2023-12-05 ASSESSMENT — ENCOUNTER SYMPTOMS
SHORTNESS OF BREATH: 0
VOMITING: 0
COUGH: 0
BLOOD IN STOOL: 0
ABDOMINAL PAIN: 0

## 2023-12-05 NOTE — PROGRESS NOTES
I have reviewed all needed documentation in preparation for visit. Verified patient by name and date of birth  Chief Complaint   Patient presents with    [facility]     Establish care, and patient has been experiencing fatigue. Patient's oncologist recommended labs. There were no vitals filed for this visit. Health Maintenance Due   Topic Date Due    COVID-19 Vaccine (1) Never done    Depression Screen  Never done    Hepatitis C screen  Never done    DTaP/Tdap/Td vaccine (1 - Tdap) Never done    Shingles vaccine (1 of 2) Never done    DEXA (modify frequency per FRAX score)  Never done    Respiratory Syncytial Virus (RSV) age 61 yrs+ (3 - 1-dose 60+ series) Never done    Pneumococcal 65+ years Vaccine (1 - PCV) Never done    Annual Wellness Visit (AWV)  Never done    Flu vaccine (1) Never done       1. \"Have you been to the ER, urgent care clinic since your last visit? Hospitalized since your last visit? \" No    2. \"Have you seen or consulted any other health care providers outside of the [address] [place] [address] since your last visit? \" Yes, Oncologist    3. For patients aged 43-73: Has the patient had a colonoscopy / FIT/ Cologuard? NA      If the patient is female:    4. For patients aged 43-66: Has the patient had a mammogram within the past 2 years? NA      5. For patients aged 21-65: Has the patient had a pap smear?  NA        RONNELL Jay

## 2023-12-06 LAB
ALBUMIN SERPL-MCNC: 4 G/DL (ref 3.5–5)
ALBUMIN/GLOB SERPL: 1.1 (ref 1.1–2.2)
ALP SERPL-CCNC: 83 U/L (ref 45–117)
ALT SERPL-CCNC: 28 U/L (ref 12–78)
ANION GAP SERPL CALC-SCNC: 5 MMOL/L (ref 5–15)
APPEARANCE UR: CLEAR
AST SERPL-CCNC: 18 U/L (ref 15–37)
BACTERIA URNS QL MICRO: NEGATIVE /HPF
BASOPHILS # BLD: 0 K/UL (ref 0–0.1)
BASOPHILS NFR BLD: 1 % (ref 0–1)
BILIRUB SERPL-MCNC: 0.3 MG/DL (ref 0.2–1)
BILIRUB UR QL: NEGATIVE
BUN SERPL-MCNC: 19 MG/DL (ref 6–20)
BUN/CREAT SERPL: 25 (ref 12–20)
CALCIUM SERPL-MCNC: 9.5 MG/DL (ref 8.5–10.1)
CHLORIDE SERPL-SCNC: 106 MMOL/L (ref 97–108)
CO2 SERPL-SCNC: 31 MMOL/L (ref 21–32)
COLOR UR: NORMAL
CREAT SERPL-MCNC: 0.75 MG/DL (ref 0.55–1.02)
DIFFERENTIAL METHOD BLD: ABNORMAL
EOSINOPHIL # BLD: 0.1 K/UL (ref 0–0.4)
EOSINOPHIL NFR BLD: 2 % (ref 0–7)
EPITH CASTS URNS QL MICRO: NORMAL /LPF
ERYTHROCYTE [DISTWIDTH] IN BLOOD BY AUTOMATED COUNT: 12.3 % (ref 11.5–14.5)
EST. AVERAGE GLUCOSE BLD GHB EST-MCNC: 114 MG/DL
GLOBULIN SER CALC-MCNC: 3.7 G/DL (ref 2–4)
GLUCOSE SERPL-MCNC: 166 MG/DL (ref 65–100)
GLUCOSE UR STRIP.AUTO-MCNC: NEGATIVE MG/DL
HBA1C MFR BLD: 5.6 % (ref 4–5.6)
HCT VFR BLD AUTO: 37.9 % (ref 35–47)
HGB BLD-MCNC: 12.2 G/DL (ref 11.5–16)
HGB UR QL STRIP: NEGATIVE
HYALINE CASTS URNS QL MICRO: NORMAL /LPF (ref 0–5)
IMM GRANULOCYTES # BLD AUTO: 0 K/UL (ref 0–0.04)
IMM GRANULOCYTES NFR BLD AUTO: 0 % (ref 0–0.5)
KETONES UR QL STRIP.AUTO: NEGATIVE MG/DL
LEUKOCYTE ESTERASE UR QL STRIP.AUTO: NEGATIVE
LYMPHOCYTES # BLD: 2.5 K/UL (ref 0.8–3.5)
LYMPHOCYTES NFR BLD: 30 % (ref 12–49)
MCH RBC QN AUTO: 32.3 PG (ref 26–34)
MCHC RBC AUTO-ENTMCNC: 32.2 G/DL (ref 30–36.5)
MCV RBC AUTO: 100.3 FL (ref 80–99)
MONOCYTES # BLD: 0.7 K/UL (ref 0–1)
MONOCYTES NFR BLD: 8 % (ref 5–13)
NEUTS SEG # BLD: 4.9 K/UL (ref 1.8–8)
NEUTS SEG NFR BLD: 59 % (ref 32–75)
NITRITE UR QL STRIP.AUTO: NEGATIVE
NRBC # BLD: 0 K/UL (ref 0–0.01)
NRBC BLD-RTO: 0 PER 100 WBC
PH UR STRIP: 6.5 (ref 5–8)
PLATELET # BLD AUTO: 210 K/UL (ref 150–400)
PMV BLD AUTO: 10.1 FL (ref 8.9–12.9)
POTASSIUM SERPL-SCNC: 4.1 MMOL/L (ref 3.5–5.1)
PROT SERPL-MCNC: 7.7 G/DL (ref 6.4–8.2)
PROT UR STRIP-MCNC: NEGATIVE MG/DL
RBC # BLD AUTO: 3.78 M/UL (ref 3.8–5.2)
RBC #/AREA URNS HPF: NORMAL /HPF (ref 0–5)
SODIUM SERPL-SCNC: 142 MMOL/L (ref 136–145)
SP GR UR REFRACTOMETRY: <1.005 (ref 1–1.03)
T4 SERPL-MCNC: 7.9 UG/DL (ref 4.8–13.9)
TSH SERPL DL<=0.05 MIU/L-ACNC: 0.54 UIU/ML (ref 0.36–3.74)
URINE CULTURE IF INDICATED: NORMAL
UROBILINOGEN UR QL STRIP.AUTO: 0.2 EU/DL (ref 0.2–1)
WBC # BLD AUTO: 8.2 K/UL (ref 3.6–11)
WBC URNS QL MICRO: NORMAL /HPF (ref 0–4)

## 2023-12-11 ENCOUNTER — TELEPHONE (OUTPATIENT)
Age: 78
End: 2023-12-11

## 2023-12-11 NOTE — TELEPHONE ENCOUNTER
Called patient and verified name and ,   Pt understands and Complies with provider instructions and directions. Pt reports fatigue but also reports sleeping more, could hydrate more. Encouraged good sleep habits, healthy diet/hydration, and sleep hygiene. No further questions at this time. Pt grateful for the call.      Cele Coleman, KASHMIR  23  3:22 PM

## 2024-01-16 PROBLEM — R73.09 ELEVATED HEMOGLOBIN A1C: Status: ACTIVE | Noted: 2024-01-16

## 2024-02-12 ENCOUNTER — TELEPHONE (OUTPATIENT)
Age: 79
End: 2024-02-12

## 2024-02-12 NOTE — TELEPHONE ENCOUNTER
Pt is experiencing a rash that started from her hand. And is now spreading to her face to her back side. She would like a medication sent over to the pharmacy. It is in the shape of a perfect ring shape and and red, itchy, swelling. Please call back as soon as possible on this matter.

## 2024-02-14 ENCOUNTER — OFFICE VISIT (OUTPATIENT)
Age: 79
End: 2024-02-14
Payer: COMMERCIAL

## 2024-02-14 VITALS
TEMPERATURE: 97.7 F | BODY MASS INDEX: 25.86 KG/M2 | WEIGHT: 137 LBS | HEIGHT: 61 IN | SYSTOLIC BLOOD PRESSURE: 120 MMHG | OXYGEN SATURATION: 94 % | HEART RATE: 62 BPM | RESPIRATION RATE: 18 BRPM | DIASTOLIC BLOOD PRESSURE: 70 MMHG

## 2024-02-14 DIAGNOSIS — B35.4 TINEA CORPORIS: Primary | ICD-10-CM

## 2024-02-14 PROCEDURE — G8427 DOCREV CUR MEDS BY ELIG CLIN: HCPCS | Performed by: PHYSICIAN ASSISTANT

## 2024-02-14 PROCEDURE — G8419 CALC BMI OUT NRM PARAM NOF/U: HCPCS | Performed by: PHYSICIAN ASSISTANT

## 2024-02-14 PROCEDURE — G2211 COMPLEX E/M VISIT ADD ON: HCPCS | Performed by: PHYSICIAN ASSISTANT

## 2024-02-14 PROCEDURE — G8400 PT W/DXA NO RESULTS DOC: HCPCS | Performed by: PHYSICIAN ASSISTANT

## 2024-02-14 PROCEDURE — G8484 FLU IMMUNIZE NO ADMIN: HCPCS | Performed by: PHYSICIAN ASSISTANT

## 2024-02-14 PROCEDURE — 99213 OFFICE O/P EST LOW 20 MIN: CPT | Performed by: PHYSICIAN ASSISTANT

## 2024-02-14 PROCEDURE — 1090F PRES/ABSN URINE INCON ASSESS: CPT | Performed by: PHYSICIAN ASSISTANT

## 2024-02-14 PROCEDURE — 1036F TOBACCO NON-USER: CPT | Performed by: PHYSICIAN ASSISTANT

## 2024-02-14 PROCEDURE — 1123F ACP DISCUSS/DSCN MKR DOCD: CPT | Performed by: PHYSICIAN ASSISTANT

## 2024-02-14 RX ORDER — FLUCONAZOLE 150 MG/1
150 TABLET ORAL
Qty: 6 TABLET | Refills: 0 | Status: SHIPPED | OUTPATIENT
Start: 2024-02-14

## 2024-02-14 NOTE — PROGRESS NOTES
I have reviewed all needed documentation in preparation for visit. Verified patient by name and date of birth  Chief Complaint   Patient presents with    Rash     Start 2 weeks ago- rash on face,back,arms-        Vitals:    02/14/24 1456   BP: 120/70   Site: Right Upper Arm   Position: Sitting   Cuff Size: Medium Adult   Pulse: 62   Resp: 18   Temp: 97.7 °F (36.5 °C)   TempSrc: Temporal   SpO2: 94%   Weight: 62.1 kg (137 lb)   Height: 1.549 m (5' 1\")       Health Maintenance Due   Topic Date Due    COVID-19 Vaccine (1) Never done    DTaP/Tdap/Td vaccine (1 - Tdap) Never done    Shingles vaccine (1 of 2) Never done    DEXA (modify frequency per FRAX score)  Never done    Pneumococcal 65+ years Vaccine (1 - PCV) Never done    Flu vaccine (1) Never done       1. \"Have you been to the ER, urgent care clinic since your last visit?  Hospitalized since your last visit?\" No    2. \"Have you seen or consulted any other health care providers outside of the [facility] since your last visit?\" No     3. For patients aged 45-75: Has the patient had a colonoscopy / FIT/ Cologuard? NA - based on age      If the patient is female:    4. For patients aged 40-74: Has the patient had a mammogram within the past 2 years? NA - based on age or sex      5. For patients aged 21-65: Has the patient had a pap smear? NA - based on age or sex        Ligia [facility]  
pea-sized scattered red patches.  Not hot/tender   Neurological:      General: No focal deficit present.      Mental Status: She is alert and oriented to person, place, and time.   Psychiatric:         Mood and Affect: Mood normal.         Behavior: Behavior normal.         Thought Content: Thought content normal.         Judgment: Judgment normal.       ASSESSMENT/PLAN    ICD-10-CM    1. Tinea corporis  B35.4 fluconazole (DIFLUCAN) 150 MG tablet

## 2024-05-14 ENCOUNTER — HOSPITAL ENCOUNTER (OUTPATIENT)
Facility: HOSPITAL | Age: 79
Setting detail: RECURRING SERIES
Discharge: HOME | End: 2024-05-17
Payer: COMMERCIAL

## 2024-05-14 VITALS — HEIGHT: 61 IN | WEIGHT: 135.3 LBS | BODY MASS INDEX: 25.54 KG/M2

## 2024-05-14 PROCEDURE — 97760 ORTHOTIC MGMT&TRAING 1ST ENC: CPT

## 2024-05-14 PROCEDURE — 97162 PT EVAL MOD COMPLEX 30 MIN: CPT

## 2024-05-14 NOTE — PROGRESS NOTES
Statement of Medical Necessity  Page 1 of 2      Mary Lou Turcios 1945 Today's Date: 5/14/2024 MERARI: [employer]   Payor: UNITED HEALTHCARE / Plan: UNITED HEALTHCARE / Product Type: *No Product type* /  ME: TBD  Refills: 2                   Diagnosis  []   I97.2 Post-Mastectomy Lymphedema []   I87.2 Venous Insufficiency   [x]   I89.0 Lymphedema, other secondary  []   I83.019 Venous Stasis Ulcer LE, Right   []   I89.9 Unspecified Lymphatic Disorder []   I83.029 Venous Stasis Ulcer LE, Left   []   R60.9 Swelling not relieved by elevation []   Q82.0 Hereditary/ Congenital Lymphedema   []   C50.211 Malignant neoplasm of breast, Right []   G89.3  Cancer associated pain   []   C50.212 Malignant neoplasm of breast, Left []   L03.115 LE Cellulitis, Right   []    []   L03.116 LE Cellulitis, Left                                   Mary Lou Turcios    1945  Page 2 of 2    Physician Order for DME for Diagnosis of bilateral lower extremity lymphedema as Listed on Statement of Medical Necessity, Page 1         Recommended Product:  Units Upper Extremity Rt Lt Units Lower Extremity Rt Lt    Circ-Aid, Ready Wrap, Sigvaris Arm    Inelastic binders (Circ-Aid, Farrow)  []Foot   []Below Knee   []Knee   []Thigh      Circ-Aid Ready Wrap, Sigvaris hand    Ivan Hung, night use []Full Leg  []Lower Leg      Tribute Arm, night use    Tribute, night use  []Full Leg  []Lower Leg      Ivan Hung Arm, night use    Stalin Sleeve Leg/ Foot, night use      Gradiant Compression Sleeves  []Custom [] RM Arm:  []CCL1 []CCL2 []CCL3  []Custom [] RM:  []Glove  []Gauntlet:                         []CCL1 []CCL2 []CCL3     6 Gradient Compression Stockings   [x]Custom  []RM Lower Extremity:   []CCL1       [x]CCL2         []CCL3   []Knee       [x]Thigh        []Waist Length x x    Stalin sleeve arm w/ hand, night use    Tribute Wrap, night use      Compression Bra   3 Custom gradient Compression waist length bike short  [x]CCL1  x x    Compression Vest         The

## 2024-05-14 NOTE — THERAPY EVALUATION
[facility]  a part of [facility]   [address], [address]  [place] [address]  Phone: [phone]    Fax: [phone]                                                                                PT/OT LYMPHEDEMA - EVALUATION/PLAN OF CARE NOTE (updated 3/23)      Date: 2024          Patient Name:  Mary Lou Turcios :  1945   Medical   Diagnosis:  No admission diagnoses are documented for this encounter. Treatment Diagnosis:  I89.0     Lymphedema, not elsewhere classified    Referral Source:  Yeong, Kelly L, APRN - * Provider #:  [id number]                Insurance: Payor: UNITED HEALTHCARE / Plan: UNITED HEALTHCARE / Product Type: *No Product type* /      Patient  verified yes     Visit #   Current  / Total 1 1   Time   In / Out 1335 1520   Total Treatment Time 105   Total Timed Codes 32   1:1 Treatment Time 32      [facility] Totals Reminder:  bill using total billable   min of TIMED therapeutic procedures and modalities.   8-22 min = 1 unit; 23-37 min = 2 units; 38-52 min = 3 units;  53-67 min = 4 units; 68-82 min = 5 units         SUBJECTIVE  Pain Level (0-10 scale): 0 out of 10 numeric scale  []constant []intermittent []improving []worsening []no change since onset    Any medication changes, allergies to medications, adverse drug reactions, diagnosis change, or new procedure performed?: [x] No    [] Yes (see summary sheet for update)  Medications: Verified on Patient Summary List    Subjective functional status/changes:     The right thigh is getting bigger and heavier at the top part.  Stockings are fitting tightly a the top and it is hard to get them over top of the upper thigh.  Left leg is swelling now too,  Start of Care: 2024  Onset Date: 2019  Current symptoms/Complaints: swelling in both legs with right leg, primarily thigh, greater than left leg  Mechanism of Injury:   Patient returns for reassessment of right lower extremity lymphedema with

## 2024-09-23 ENCOUNTER — HOSPITAL ENCOUNTER (OUTPATIENT)
Facility: HOSPITAL | Age: 79
Setting detail: RECURRING SERIES
Discharge: HOME | End: 2024-09-26
Payer: COMMERCIAL

## 2024-09-23 PROCEDURE — 97535 SELF CARE MNGMENT TRAINING: CPT

## 2024-12-03 ENCOUNTER — HOSPITAL ENCOUNTER (OUTPATIENT)
Facility: HOSPITAL | Age: 79
Setting detail: RECURRING SERIES
Discharge: HOME | End: 2024-12-06
Payer: MEDICARE

## 2024-12-03 PROCEDURE — 97535 SELF CARE MNGMENT TRAINING: CPT

## 2024-12-03 NOTE — THERAPY RECERTIFICATION
aspects of compression garment use including wearing schedule, donning and doffing, lifespan, and laundering.   The bike short was not remade following requested specifications and an additional remake will be requested.     FOTO: 57 today compared to 66 on  2024      Problem List: edema affecting function    Treatment Plan may include any combination of the followin Therapeutic Exercise, 32861 Manual Therapy, 36390 Self Care/Home Management, 93834 Orthotic Management and Training, and 90396 Orthotic Management and Training, Subsequent  Patient Goal(s) has been updated and includes: a time extension     Goals for this certification period include and are to be achieved in   2 additional treatments for a total of 5 treatments :     Short Term Goals: To be accomplished in 5  treatments    1. Patient will be measured for and obtain comfortable and optimal fitting compression products to prevent reaccumulation of fluid at discharge which could impair patient's ability to perform safe mobility and dressing.      2.Patient/caregiver will demonstrate improved edema management as evidenced by performing donning/doffing of garments modified independent 3/3 times within session to aid in reducing risk for infection and promote transition to maintenance phase of CDT.      Frequency / Duration:   Patient to be seen   1-2   times per month for   2 additional  treatments for a total of 5 treatments :     Assessments/Recommendations for Continuation of Care:      Patient returns today for follow-up compression garment fitting.  Thigh highs fit well and are working for her.  She would like to order a third set if eligible to do so.  The bike short was remade but it was not made per request and silicone bands were added to the end of leg on the bike short rather than at the waist as requested.  Therapist will advise [facility] and request a remake.   Due to delays with compression garment ordering patient will need

## 2024-12-03 NOTE — PROGRESS NOTES
PHYSICAL THERAPY/OCCUPATIONAL THERAPY - MEDICARE DAILY TREATMENT NOTE (updated 3/23)    Date: 12/3/2024        Patient Name:  Mary Lou Turcios :  1945   Medical   Diagnosis:  Lymphedema, not elsewhere classified [I89.0] Treatment Diagnosis:  I89.0     Lymphedema, not elsewhere classified    Referral Source:  Yeong, Kelly L, KYLE - * Insurance:   Payor: /                   Patient  verified yes     Visit #   Current  / Total 3 5   Time   In / Out  1215  1302   Total Treatment Time  47   Total Timed Codes  22   1:1 Treatment Time  22      [facility] Totals Reminder:  bill using total billable   min of TIMED therapeutic procedures and modalities.   8-22 min = 1 unit; 23-37 min = 2 units; 38-52 min = 3 units;  53-67 min = 4 units; 68-82 min = 5 units     SUBJECTIVE    Pain Level (0-10 scale): 0 out of 10 numeric scale    Any medication changes, allergies to medications, adverse drug reactions, diagnosis change, or new procedure performed?: [x] No    [] Yes (see summary sheet for update)  Medications: Verified on Patient Summary List    Subjective functional status/changes:      I like the stockings and they are fine.  I do not like the short and I don't want another one.It falls down when I wear it.    OBJECTIVE    Therapeutic Procedures:  Tx Min Billable or 1:1 Min (if diff from Tx Min) Procedure, Rationale, Specifics    [address] Self Care/Home Management (timed):  improve patient knowledge and understanding of pain reducing techniques, positioning, posture/ergonomics, home safety, activity modification, diagnosis/prognosis, and physical therapy expectations, procedures and progression  to improve patient's ability to progress to PLOF and address remaining functional goals.  (see flow sheet as applicable)    Details if applicable:     Skin/wound care/debridement: Reviewed skin care principles:   Low pH lotion      Compression garment donning and doffing:         Patient wore in new compression thigh highs.

## 2024-12-05 ENCOUNTER — OFFICE VISIT (OUTPATIENT)
Age: 79
End: 2024-12-05
Payer: MEDICARE

## 2024-12-05 VITALS
WEIGHT: 135 LBS | HEART RATE: 65 BPM | TEMPERATURE: 97.8 F | RESPIRATION RATE: 18 BRPM | OXYGEN SATURATION: 96 % | BODY MASS INDEX: 25.49 KG/M2 | HEIGHT: 61 IN | SYSTOLIC BLOOD PRESSURE: 130 MMHG | DIASTOLIC BLOOD PRESSURE: 71 MMHG

## 2024-12-05 DIAGNOSIS — Z66 DNR (DO NOT RESUSCITATE): ICD-10-CM

## 2024-12-05 DIAGNOSIS — Z00.00 MEDICARE ANNUAL WELLNESS VISIT, SUBSEQUENT: Primary | ICD-10-CM

## 2024-12-05 DIAGNOSIS — R35.0 URINARY FREQUENCY: ICD-10-CM

## 2024-12-05 LAB
BILIRUBIN, URINE, POC: NEGATIVE
BLOOD URINE, POC: NORMAL
GLUCOSE URINE, POC: NEGATIVE
KETONES, URINE, POC: NEGATIVE
LEUKOCYTE ESTERASE, URINE, POC: NEGATIVE
NITRITE, URINE, POC: NEGATIVE
PH, URINE, POC: 7 (ref 4.6–8)
PROTEIN,URINE, POC: NEGATIVE
SPECIFIC GRAVITY, URINE, POC: 1.01 (ref 1–1.03)
URINALYSIS CLARITY, POC: NORMAL
URINALYSIS COLOR, POC: NORMAL
UROBILINOGEN, POC: NORMAL MG/DL

## 2024-12-05 PROCEDURE — 99213 OFFICE O/P EST LOW 20 MIN: CPT | Performed by: PHYSICIAN ASSISTANT

## 2024-12-05 PROCEDURE — 81001 URINALYSIS AUTO W/SCOPE: CPT | Performed by: PHYSICIAN ASSISTANT

## 2024-12-05 SDOH — ECONOMIC STABILITY: FOOD INSECURITY: WITHIN THE PAST 12 MONTHS, THE FOOD YOU BOUGHT JUST DIDN'T LAST AND YOU DIDN'T HAVE MONEY TO GET MORE.: PATIENT DECLINED

## 2024-12-05 SDOH — ECONOMIC STABILITY: FOOD INSECURITY: WITHIN THE PAST 12 MONTHS, YOU WORRIED THAT YOUR FOOD WOULD RUN OUT BEFORE YOU GOT MONEY TO BUY MORE.: PATIENT DECLINED

## 2024-12-05 SDOH — ECONOMIC STABILITY: INCOME INSECURITY: HOW HARD IS IT FOR YOU TO PAY FOR THE VERY BASICS LIKE FOOD, HOUSING, MEDICAL CARE, AND HEATING?: PATIENT DECLINED

## 2024-12-05 ASSESSMENT — PATIENT HEALTH QUESTIONNAIRE - PHQ9
SUM OF ALL RESPONSES TO PHQ QUESTIONS 1-9: 0
SUM OF ALL RESPONSES TO PHQ9 QUESTIONS 1 & 2: 0
SUM OF ALL RESPONSES TO PHQ QUESTIONS 1-9: 0
SUM OF ALL RESPONSES TO PHQ QUESTIONS 1-9: 0
2. FEELING DOWN, DEPRESSED OR HOPELESS: NOT AT ALL
SUM OF ALL RESPONSES TO PHQ QUESTIONS 1-9: 0
1. LITTLE INTEREST OR PLEASURE IN DOING THINGS: NOT AT ALL

## 2024-12-05 ASSESSMENT — ENCOUNTER SYMPTOMS
COUGH: 0
SHORTNESS OF BREATH: 0

## 2024-12-05 NOTE — PATIENT INSTRUCTIONS
healthcare professional. [employer] disclaims any warranty or liability for your use of this information.      Personalized Preventive Plan for Mary Lou Turcios - 12/5/2024  Medicare offers a range of preventive health benefits. Some of the tests and screenings are paid in full while other may be subject to a deductible, co-insurance, and/or copay.    Some of these benefits include a comprehensive review of your medical history including lifestyle, illnesses that may run in your family, and various assessments and screenings as appropriate.    After reviewing your medical record and screening and assessments performed today your provider may have ordered immunizations, labs, imaging, and/or referrals for you.  A list of these orders (if applicable) as well as your Preventive Care list are included within your After Visit Summary for your review.    Other Preventive Recommendations:    A preventive eye exam performed by an eye specialist is recommended every 1-2 years to screen for glaucoma; cataracts, macular degeneration, and other eye disorders.  A preventive dental visit is recommended every 6 months.  Try to get at least 150 minutes of exercise per week or 10,000 steps per day on a pedometer .  Order or download the FREE \"Exercise & Physical Activity: Your Everyday Guide\" from [employer]. Call 1-866.368.3568 or search [employer] online.  You need 9578-8912 mg of calcium and 1812-6447 IU of vitamin D per day. It is possible to meet your calcium requirement with diet alone, but a vitamin D supplement is usually necessary to meet this goal.  When exposed to the sun, use a sunscreen that protects against both UVA and UVB radiation with an SPF of 30 or greater. Reapply every 2 to 3 hours or after sweating, drying off with a towel, or swimming.  Always wear a seat belt when traveling in a car. Always wear a helmet when riding a bicycle or motorcycle.

## 2024-12-05 NOTE — PROGRESS NOTES
I have reviewed all needed documentation in preparation for visit. Verified patient by name and date of birth  Chief Complaint   Patient presents with    Medicare AWV     No concerns        Vitals:    12/05/24 1435   BP: 130/71   Site: Right Upper Arm   Position: Sitting   Cuff Size: Medium Adult   Pulse: 65   Resp: 18   Temp: 97.8 °F (36.6 °C)   TempSrc: Temporal   SpO2: 96%   Weight: 61.2 kg (135 lb)   Height: 1.549 m (5' 1\")       Health Maintenance Due   Topic Date Due    DTaP/Tdap/Td vaccine (1 - Tdap) Never done    Shingles vaccine (1 of 2) Never done    DEXA (modify frequency per FRAX score)  Never done    Pneumococcal 65+ years Vaccine (1 of 1 - PCV) Never done    Annual Wellness Visit (Medicare Advantage)  Never done    Flu vaccine (1) Never done    COVID-19 Vaccine (1 - 2023-24 season) Never done     \"Have you been to the ER, urgent care clinic since your last visit?  Hospitalized since your last visit?\"    NO    “Have you seen or consulted any other health care providers outside of [facility] since your last visit?”    YES [facility]            Click Here for Release of Records Request         Ligia duke CMA  
Medicare Annual Wellness Visit    Mary Lou Turcios is here for Medicare AWV (No concerns )    Assessment & Plan   Assessment & Plan  Medicare annual wellness visit, subsequent   Doing well. ACP/DNR discussed today.      DNR (do not resuscitate)          Advance Care Planning (ACP) Provider Note - Comprehensive     Date of ACP Conversation: 12/05/24  Persons included in Conversation:  patient  Length of ACP Conversation in minutes:  <16 minutes (Non-Billable)    Authorized Decision Maker (if patient is incapable of making informed decisions):   This person is:  Extended Emergency Contact Information  Primary Emergency Contact: Mother Montaño   [facility]  Home Phone: [phone]  Relation: Other  Secondary Emergency Contact: SisterCynthia  Address: [id number] [place] [id number] [facility]  Home Phone: [phone]  Relation: Other          General ACP for ALL Patients with Decision Making Capacity:   Importance of advance care planning, including choosing a healthcare agent to communicate patient's healthcare decisions if patient lost the ability to make decisions, such as after a sudden illness or accident  Understanding of the healthcare agent role was assessed and information provided  Exploration of values, goals, and preferences if recovery is not expected, even with continued medical treatment in the event of: Imminent death  Severe, permanent brain injury  \"In these circumstances, what matters most to you?\"  Care focused more on comfort or quality of life.    Review of Existing Advance Directive:  None - form given today    For Serious or Chronic Illness:  Understanding of medical condition      Interventions Provided:  Recommended completion of Advance Directive form after review of ACP materials and conversation with prospective healthcare agent   Recommended communicating the plan and making copies for the healthcare agent, personal physician, and others 
Mental Status: She is alert and oriented to person, place, and time.   Psychiatric:         Mood and Affect: Mood normal.         Behavior: Behavior normal.         Thought Content: Thought content normal.         Judgment: Judgment normal.       ASSESSMENT/PLAN  Medicare annual wellness visit, subsequent  DNR (do not resuscitate)  -     DO NOT RESUSCITATE (DNR)  Urinary frequency  -     AMB POC URINALYSIS DIP STICK AUTO W/ MICRO normal  Discussed pelvic floor exercises and encouraged pt to try estrogen vaginal cream if sx are bothersome. Pt declines pelvic floor PT for now.      Refills Sent.  
no
yes

## 2025-02-27 DIAGNOSIS — B35.6 TINEA CRURIS: Primary | ICD-10-CM

## 2025-02-27 RX ORDER — FLUCONAZOLE 150 MG/1
150 TABLET ORAL
Qty: 2 TABLET | Refills: 0 | Status: SHIPPED | OUTPATIENT
Start: 2025-02-27 | End: 2025-03-13

## 2025-06-13 ENCOUNTER — TELEPHONE (OUTPATIENT)
Age: 80
End: 2025-06-13

## 2025-06-13 SDOH — HEALTH STABILITY: PHYSICAL HEALTH: ON AVERAGE, HOW MANY DAYS PER WEEK DO YOU ENGAGE IN MODERATE TO STRENUOUS EXERCISE (LIKE A BRISK WALK)?: 7 DAYS

## 2025-06-13 ASSESSMENT — LIFESTYLE VARIABLES
HOW MANY STANDARD DRINKS CONTAINING ALCOHOL DO YOU HAVE ON A TYPICAL DAY: PATIENT DOES NOT DRINK
HOW MANY STANDARD DRINKS CONTAINING ALCOHOL DO YOU HAVE ON A TYPICAL DAY: 0
HOW OFTEN DO YOU HAVE A DRINK CONTAINING ALCOHOL: NEVER
HOW OFTEN DO YOU HAVE SIX OR MORE DRINKS ON ONE OCCASION: 1
HOW OFTEN DO YOU HAVE A DRINK CONTAINING ALCOHOL: 1

## 2025-06-13 ASSESSMENT — PATIENT HEALTH QUESTIONNAIRE - PHQ9
1. LITTLE INTEREST OR PLEASURE IN DOING THINGS: NOT AT ALL
SUM OF ALL RESPONSES TO PHQ QUESTIONS 1-9: 0
SUM OF ALL RESPONSES TO PHQ QUESTIONS 1-9: 0
2. FEELING DOWN, DEPRESSED OR HOPELESS: NOT AT ALL
SUM OF ALL RESPONSES TO PHQ QUESTIONS 1-9: 0
SUM OF ALL RESPONSES TO PHQ QUESTIONS 1-9: 0

## 2025-06-17 ENCOUNTER — OFFICE VISIT (OUTPATIENT)
Age: 80
End: 2025-06-17
Payer: MEDICARE

## 2025-06-17 VITALS
HEART RATE: 58 BPM | WEIGHT: 138 LBS | RESPIRATION RATE: 17 BRPM | SYSTOLIC BLOOD PRESSURE: 129 MMHG | DIASTOLIC BLOOD PRESSURE: 76 MMHG | HEIGHT: 61 IN | OXYGEN SATURATION: 95 % | BODY MASS INDEX: 26.06 KG/M2 | TEMPERATURE: 97.7 F

## 2025-06-17 DIAGNOSIS — R35.0 URINARY FREQUENCY: ICD-10-CM

## 2025-06-17 DIAGNOSIS — Z00.00 MEDICARE ANNUAL WELLNESS VISIT, SUBSEQUENT: Primary | ICD-10-CM

## 2025-06-17 DIAGNOSIS — J30.9 ALLERGIC RHINITIS, UNSPECIFIED SEASONALITY, UNSPECIFIED TRIGGER: ICD-10-CM

## 2025-06-17 DIAGNOSIS — L30.9 PERIORBITAL DERMATITIS: ICD-10-CM

## 2025-06-17 PROCEDURE — 1126F AMNT PAIN NOTED NONE PRSNT: CPT | Performed by: PHYSICIAN ASSISTANT

## 2025-06-17 PROCEDURE — 1160F RVW MEDS BY RX/DR IN RCRD: CPT | Performed by: PHYSICIAN ASSISTANT

## 2025-06-17 PROCEDURE — G0439 PPPS, SUBSEQ VISIT: HCPCS | Performed by: PHYSICIAN ASSISTANT

## 2025-06-17 PROCEDURE — 1159F MED LIST DOCD IN RCRD: CPT | Performed by: PHYSICIAN ASSISTANT

## 2025-06-17 PROCEDURE — 1123F ACP DISCUSS/DSCN MKR DOCD: CPT | Performed by: PHYSICIAN ASSISTANT

## 2025-06-17 PROCEDURE — 99214 OFFICE O/P EST MOD 30 MIN: CPT | Performed by: PHYSICIAN ASSISTANT

## 2025-06-17 RX ORDER — ACETAMINOPHEN 500 MG
500 TABLET ORAL EVERY 6 HOURS PRN
COMMUNITY

## 2025-06-17 RX ORDER — METRONIDAZOLE TOPICAL 7.5 MG/G
GEL TOPICAL
Qty: 45 G | Refills: 2 | Status: SHIPPED | OUTPATIENT
Start: 2025-06-17

## 2025-06-17 RX ORDER — LORATADINE 10 MG/1
10 TABLET ORAL DAILY
COMMUNITY
Start: 2025-06-17

## 2025-06-17 SDOH — ECONOMIC STABILITY: FOOD INSECURITY: WITHIN THE PAST 12 MONTHS, YOU WORRIED THAT YOUR FOOD WOULD RUN OUT BEFORE YOU GOT MONEY TO BUY MORE.: PATIENT DECLINED

## 2025-06-17 SDOH — ECONOMIC STABILITY: FOOD INSECURITY: WITHIN THE PAST 12 MONTHS, THE FOOD YOU BOUGHT JUST DIDN'T LAST AND YOU DIDN'T HAVE MONEY TO GET MORE.: PATIENT DECLINED

## 2025-06-17 ASSESSMENT — ENCOUNTER SYMPTOMS
SORE THROAT: 0
SHORTNESS OF BREATH: 0
SINUS PRESSURE: 1
TROUBLE SWALLOWING: 0
COUGH: 0

## 2025-06-17 NOTE — PROGRESS NOTES
I have reviewed all needed documentation in preparation for visit. Verified patient by name and date of birth  Chief Complaint   Patient presents with    Medicare AWV     No concerns        Vitals:    06/17/25 1343   BP: 129/76   BP Site: Right Upper Arm   Patient Position: Sitting   BP Cuff Size: Medium Adult   Pulse: 58   Resp: 17   Temp: 97.7 °F (36.5 °C)   TempSrc: Temporal   SpO2: 95%   Weight: 62.6 kg (138 lb)   Height: 1.549 m (5' 1\")       Health Maintenance Due   Topic Date Due    DTaP/Tdap/Td vaccine (1 - Tdap) Never done    Shingles vaccine (1 of 2) Never done    Pneumococcal 50+ years Vaccine (1 of 1 - PCV) Never done    DEXA (modify frequency per FRAX score)  Never done    Respiratory Syncytial Virus (RSV) Pregnant or age 60 yrs+ (1 - 1-dose 75+ series) Never done    COVID-19 Vaccine (1 - 2024-25 season) Never done    Annual Wellness Visit (Medicare Advantage)  01/01/2025     \"Have you been to the ER, urgent care clinic since your last visit?  Hospitalized since your last visit?\"    NO    “Have you seen or consulted any other health care providers outside of [facility] since your last visit?”    YES [facility]            Click Here for Release of Records Request         Ligia duke CMA  
Intersecting Shapes Test    Patient was asked to copy a drawing of Intersecting Pentagons (an alternative cognitive screen to the clock drawing test)    To get a score of 1, there had to be two figures that seem to intersect. At least one of the figures had to have five angles. Tremor, rotation, relative size, and symmetry in the drawing were ignored.    Result: 1/1    References:  https://pmc.ncbi.nlm.nih.gov/articles/RLK5516595/  https://www.sciencedirect.com/science/article/pii/P3493987639195321   
Medicare Annual Wellness Visit    Mary Lou Turcios is here for Medicare AWV (No concerns )    Assessment & Plan   Medicare annual wellness visit, subsequent     No follow-ups on file.     Subjective   Patient's complete Health Risk Assessment and screening values have been reviewed and are found in Flowsheets. The following problems were reviewed today and where indicated follow up appointments were made and/or referrals ordered.    Positive Risk Factor Screenings with Interventions:                     ADL's:   Patient reports needing help with:  Select all that apply: (!) (Proxy-Rptd) Transportation  Interventions:  Patient declined any further interventions or treatment    Sister Zayra helps with this. No recent change.         Objective   Vitals:    06/17/25 1343   BP: 129/76   BP Site: Right Upper Arm   Patient Position: Sitting   BP Cuff Size: Medium Adult   Pulse: 58   Resp: 17   Temp: 97.7 °F (36.5 °C)   TempSrc: Temporal   SpO2: 95%   Weight: 62.6 kg (138 lb)   Height: 1.549 m (5' 1\")      Body mass index is 26.07 kg/m².                 Allergies   Allergen Reactions    Naproxen Sodium Nausea Only     Prior to Visit Medications    Medication Sig Taking? Authorizing Provider   acetaminophen (TYLENOL) 500 MG tablet Take 1 tablet by mouth every 6 hours as needed for Pain Yes Provider, MD Jef   anastrozole (ARIMIDEX) 1 MG tablet Take 1 tablet by mouth daily Yes Automatic Reconciliation, Ar   Calcium Carbonate-Vitamin D (OYSTER SHELL CALCIUM/D) 500-5 MG-MCG TABS Take 1 tablet by mouth daily Yes Automatic Reconciliation, Ar       CareTeam (Including outside providers/suppliers regularly involved in providing care):   Patient Care Team:  Michelle Wu PA-C as PCP - General (Physician Assistant)  Michelle Wu PA-C as PCP - Empaneled Provider     Recommendations for Preventive Services Due: see orders and patient instructions/AVS.  Recommended screening schedule for the next 5-10 years is provided to 
TMs with fluid. No erythema.     Nose: Congestion and rhinorrhea present.      Comments: Nasal mucosa pale, inflamed.        Mouth/Throat:      Mouth: Mucous membranes are moist.      Pharynx: Oropharynx is clear. No oropharyngeal exudate.   Eyes:      Extraocular Movements: Extraocular movements intact.      Conjunctiva/sclera: Conjunctivae normal.      Pupils: Pupils are equal, round, and reactive to light.   Cardiovascular:      Rate and Rhythm: Normal rate and regular rhythm.      Heart sounds: Normal heart sounds.   Pulmonary:      Effort: Pulmonary effort is normal. No respiratory distress.      Breath sounds: Normal breath sounds.   Musculoskeletal:      Cervical back: Normal range of motion and neck supple. No rigidity.   Lymphadenopathy:      Cervical: No cervical adenopathy.   Skin:     General: Skin is warm and dry.      Findings: No rash.      Comments: Pink, dry, slightly scaling skin between eyebrows and middle forehead.    Neurological:      General: No focal deficit present.      Mental Status: She is alert and oriented to person, place, and time.   Psychiatric:         Mood and Affect: Mood normal.         Behavior: Behavior normal.         Thought Content: Thought content normal.         Judgment: Judgment normal.       ASSESSMENT/PLAN  Medicare annual wellness visit, subsequent  Allergic rhinitis, unspecified seasonality, unspecified trigger  -     loratadine (CLARITIN) 10 MG tablet; Take 1 tablet by mouth dailyOTC  Periorbital dermatitis  -     metroNIDAZOLE (METROGEL) 0.75 % gel; Apply topically 2 times daily., Disp-45 g, R-2, Normal  Urinary frequency  -     Urinalysis with Reflex to Culture; Future

## 2025-06-18 ENCOUNTER — RESULTS FOLLOW-UP (OUTPATIENT)
Age: 80
End: 2025-06-18

## 2025-06-18 LAB
APPEARANCE UR: CLEAR
BACTERIA URNS QL MICRO: NEGATIVE /HPF
BILIRUB UR QL: NEGATIVE
COLOR UR: NORMAL
EPITH CASTS URNS QL MICRO: NORMAL /LPF
GLUCOSE UR STRIP.AUTO-MCNC: NEGATIVE MG/DL
HGB UR QL STRIP: NEGATIVE
HYALINE CASTS URNS QL MICRO: NORMAL /LPF (ref 0–5)
KETONES UR QL STRIP.AUTO: NEGATIVE MG/DL
LEUKOCYTE ESTERASE UR QL STRIP.AUTO: NEGATIVE
NITRITE UR QL STRIP.AUTO: NEGATIVE
PH UR STRIP: 7.5 (ref 5–8)
PROT UR STRIP-MCNC: NEGATIVE MG/DL
RBC #/AREA URNS HPF: NORMAL /HPF (ref 0–5)
SP GR UR REFRACTOMETRY: 1.02 (ref 1–1.03)
URINE CULTURE IF INDICATED: NORMAL
UROBILINOGEN UR QL STRIP.AUTO: 1 EU/DL (ref 0.2–1)
WBC URNS QL MICRO: NORMAL /HPF (ref 0–4)

## 2025-09-03 ENCOUNTER — TELEPHONE (OUTPATIENT)
Age: 80
End: 2025-09-03